# Patient Record
Sex: FEMALE | Race: WHITE | Employment: OTHER | URBAN - METROPOLITAN AREA
[De-identification: names, ages, dates, MRNs, and addresses within clinical notes are randomized per-mention and may not be internally consistent; named-entity substitution may affect disease eponyms.]

---

## 2017-04-04 LAB
ABSOLUTE BASO #: 0.1 K/UL (ref 0–0.1)
ABSOLUTE EOS #: 0.4 K/UL (ref 0.1–0.4)
ABSOLUTE LYMPH #: 2.5 K/UL (ref 0.8–5.2)
ABSOLUTE MONO #: 0.7 K/UL (ref 0.1–0.9)
ABSOLUTE NEUT #: 6 K/UL (ref 1.3–9.1)
ANION GAP SERPL CALCULATED.3IONS-SCNC: 14 MMOL/L
BASOPHILS RELATIVE PERCENT: 0.5 %
BUN BLDV-MCNC: 17 MG/DL (ref 10–20)
CALCIUM SERPL-MCNC: 9.6 MG/DL (ref 8.7–10.8)
CHLORIDE BLD-SCNC: 101 MMOL/L (ref 95–111)
CO2: 28 MMOL/L (ref 21–32)
CREAT SERPL-MCNC: 1.1 MG/DL (ref 0.5–1.3)
EGFR AFRICAN AMERICAN: 60
EGFR IF NONAFRICAN AMERICAN: 49
EOSINOPHILS RELATIVE PERCENT: 3.8 %
GLUCOSE: 96 MG/DL (ref 70–100)
HCT VFR BLD CALC: 40.4 % (ref 36–48)
HEMOGLOBIN: 13.1 G/DL (ref 12–16)
LYMPHOCYTE %: 25.8 %
MCH RBC QN AUTO: 26.9 PG (ref 27–34)
MCHC RBC AUTO-ENTMCNC: 32.4 G/DL (ref 31–36)
MCV RBC AUTO: 83 FL (ref 80–100)
MONOCYTES # BLD: 7.3 %
NEUTROPHILS RELATIVE PERCENT: 61.9 %
PDW BLD-RTO: 13.3 % (ref 10.8–14.8)
PLATELETS: 327 K/UL (ref 150–450)
POTASSIUM SERPL-SCNC: 4.9 MMOL/L (ref 3.5–5.4)
RBC: 4.87 M/UL (ref 4–5.5)
SODIUM BLD-SCNC: 138 MMOL/L (ref 134–147)
WBC: 9.6 K/UL (ref 3.7–10.8)

## 2017-04-05 LAB
AVERAGE GLUCOSE: 123 MG/DL (ref 66–114)
HBA1C MFR BLD: 5.9 % (ref 4.2–5.8)

## 2017-04-17 ENCOUNTER — HOSPITAL ENCOUNTER (OUTPATIENT)
Dept: WOMENS IMAGING | Age: 72
Discharge: HOME | End: 2017-04-17
Payer: MEDICARE

## 2017-04-17 DIAGNOSIS — Z12.31 ENCOUNTER FOR SCREENING MAMMOGRAM FOR HIGH-RISK PATIENT: ICD-10-CM

## 2017-04-17 PROCEDURE — G0202 SCR MAMMO BI INCL CAD: HCPCS

## 2017-10-13 LAB
ABSOLUTE BASO #: 0.1 K/UL (ref 0–0.1)
ABSOLUTE EOS #: 0.3 K/UL (ref 0.1–0.4)
ABSOLUTE LYMPH #: 2.2 K/UL (ref 0.8–5.2)
ABSOLUTE MONO #: 0.7 K/UL (ref 0.1–0.9)
ABSOLUTE NEUT #: 6 K/UL (ref 1.3–9.1)
ALBUMIN SERPL-MCNC: 3.7 G/DL (ref 3.2–5.3)
ALK PHOSPHATASE: 49 IU/L (ref 35–121)
ALT SERPL-CCNC: 13 IU/L (ref 5–59)
ANION GAP SERPL CALCULATED.3IONS-SCNC: 12 MMOL/L
AST SERPL-CCNC: 15 IU/L (ref 10–42)
AVERAGE GLUCOSE: 117 MG/DL (ref 66–114)
BASOPHILS RELATIVE PERCENT: 0.7 %
BILIRUB SERPL-MCNC: 0.5 MG/DL (ref 0.2–1.3)
BUN BLDV-MCNC: 15 MG/DL (ref 10–20)
CALCIUM SERPL-MCNC: 9.3 MG/DL (ref 8.7–10.8)
CHLORIDE BLD-SCNC: 99 MMOL/L (ref 95–111)
CO2: 30 MMOL/L (ref 21–32)
CREAT SERPL-MCNC: 1.1 MG/DL (ref 0.5–1.3)
EGFR AFRICAN AMERICAN: 60
EGFR IF NONAFRICAN AMERICAN: 49
EOSINOPHILS RELATIVE PERCENT: 3.5 %
GLUCOSE: 99 MG/DL (ref 70–100)
HBA1C MFR BLD: 5.7 % (ref 4.2–5.8)
HCT VFR BLD CALC: 40.6 % (ref 36–48)
HEMOGLOBIN: 13.3 G/DL (ref 12–16)
HEPATITIS C ANTIBODY: NEGATIVE
LYMPHOCYTE %: 23.7 %
MCH RBC QN AUTO: 27.4 PG (ref 27–34)
MCHC RBC AUTO-ENTMCNC: 32.8 G/DL (ref 31–36)
MCV RBC AUTO: 83.7 FL (ref 80–100)
MONOCYTES # BLD: 7.1 %
NEUTROPHILS RELATIVE PERCENT: 64.6 %
PDW BLD-RTO: 13.8 % (ref 10.8–14.8)
PLATELETS: 269 K/UL (ref 150–450)
POTASSIUM SERPL-SCNC: 4.3 MMOL/L (ref 3.5–5.4)
RBC: 4.85 M/UL (ref 4–5.5)
SODIUM BLD-SCNC: 137 MMOL/L (ref 134–147)
TOTAL PROTEIN: 6.9 G/DL (ref 5.8–8)
WBC: 9.2 K/UL (ref 3.7–10.8)

## 2018-05-21 ENCOUNTER — HOSPITAL ENCOUNTER (OUTPATIENT)
Dept: WOMENS IMAGING | Age: 73
Discharge: HOME | End: 2018-05-23
Payer: MEDICARE

## 2018-05-21 DIAGNOSIS — Z12.31 ENCOUNTER FOR SCREENING MAMMOGRAM FOR HIGH-RISK PATIENT: ICD-10-CM

## 2018-05-21 DIAGNOSIS — M81.0 SENILE OSTEOPOROSIS: ICD-10-CM

## 2018-05-21 PROCEDURE — 77063 BREAST TOMOSYNTHESIS BI: CPT

## 2018-05-21 PROCEDURE — 77080 DXA BONE DENSITY AXIAL: CPT

## 2018-06-11 ENCOUNTER — HOSPITAL ENCOUNTER (OUTPATIENT)
Age: 73
Setting detail: SPECIMEN
Discharge: HOME | End: 2018-06-11
Payer: MEDICARE

## 2018-06-11 ENCOUNTER — HOSPITAL ENCOUNTER (OUTPATIENT)
Facility: CLINIC | Age: 73
Discharge: HOME | End: 2018-06-13
Payer: MEDICARE

## 2018-06-11 ENCOUNTER — HOSPITAL ENCOUNTER (OUTPATIENT)
Dept: GENERAL RADIOLOGY | Facility: CLINIC | Age: 73
Discharge: HOME | End: 2018-06-13
Payer: MEDICARE

## 2018-06-11 DIAGNOSIS — M25.561 ACUTE PAIN OF RIGHT KNEE: ICD-10-CM

## 2018-06-11 DIAGNOSIS — M79.604 RIGHT LEG PAIN: ICD-10-CM

## 2018-06-11 DIAGNOSIS — M71.21 SYNOVIAL CYST OF RIGHT POPLITEAL SPACE: ICD-10-CM

## 2018-06-11 LAB — D-DIMER QUANTITATIVE: 0.46 MG/L FEU

## 2018-06-11 PROCEDURE — 73560 X-RAY EXAM OF KNEE 1 OR 2: CPT

## 2018-10-17 LAB
ABSOLUTE BASO #: 0.1 K/UL (ref 0–0.1)
ABSOLUTE EOS #: 0.4 K/UL (ref 0.1–0.4)
ABSOLUTE LYMPH #: 2.4 K/UL (ref 0.8–5.2)
ABSOLUTE MONO #: 0.7 K/UL (ref 0.1–0.9)
ABSOLUTE NEUT #: 5.3 K/UL (ref 1.3–9.1)
ANION GAP SERPL CALCULATED.3IONS-SCNC: 16 MEQ/L (ref 10–19)
BASOPHILS RELATIVE PERCENT: 0.9 %
BUN BLDV-MCNC: 16 MG/DL (ref 8–23)
CALCIUM SERPL-MCNC: 9.4 MG/DL (ref 8.5–10.5)
CHLORIDE BLD-SCNC: 100 MEQ/L (ref 95–107)
CO2: 22 MEQ/L (ref 19–31)
CREAT SERPL-MCNC: 1.1 MG/DL (ref 0.6–1.3)
EGFR AFRICAN AMERICAN: 58.9 ML/MIN/1.73 M2
EGFR IF NONAFRICAN AMERICAN: 50.8 ML/MIN/1.73 M2
EOSINOPHILS RELATIVE PERCENT: 4.6 %
GLUCOSE: 98 MG/DL (ref 70–99)
HCT VFR BLD CALC: 38.7 % (ref 36–48)
HEMOGLOBIN: 12.6 G/DL (ref 12–16)
LYMPHOCYTE %: 26.5 %
MCH RBC QN AUTO: 26.5 PG (ref 27–34)
MCHC RBC AUTO-ENTMCNC: 32.6 G/DL (ref 31–36)
MCV RBC AUTO: 81.3 FL (ref 80–100)
MONOCYTES # BLD: 8.2 %
NEUTROPHILS RELATIVE PERCENT: 59.4 %
PDW BLD-RTO: 13.3 % (ref 10.8–14.8)
PLATELETS: 369 K/UL (ref 150–450)
POTASSIUM SERPL-SCNC: 4.5 MEQ/L (ref 3.5–5.4)
RBC: 4.76 M/UL (ref 4–5.5)
SODIUM BLD-SCNC: 138 MEQ/L (ref 135–146)
WBC: 8.9 K/UL (ref 3.7–10.8)

## 2018-10-18 LAB
AVERAGE GLUCOSE: 120 MG/DL (ref 66–114)
HBA1C MFR BLD: 5.8 % (ref 4.2–5.8)

## 2018-10-24 ENCOUNTER — OFFICE VISIT (OUTPATIENT)
Dept: FAMILY MEDICINE CLINIC | Age: 73
End: 2018-10-24
Payer: MEDICARE

## 2018-10-24 VITALS
RESPIRATION RATE: 16 BRPM | WEIGHT: 232 LBS | SYSTOLIC BLOOD PRESSURE: 102 MMHG | HEART RATE: 78 BPM | OXYGEN SATURATION: 98 % | DIASTOLIC BLOOD PRESSURE: 64 MMHG | TEMPERATURE: 98.4 F | BODY MASS INDEX: 36.34 KG/M2

## 2018-10-24 DIAGNOSIS — Z23 NEED FOR PROPHYLACTIC VACCINATION AND INOCULATION AGAINST INFLUENZA: ICD-10-CM

## 2018-10-24 DIAGNOSIS — K21.9 GASTROESOPHAGEAL REFLUX DISEASE WITHOUT ESOPHAGITIS: ICD-10-CM

## 2018-10-24 DIAGNOSIS — Z13.220 SCREENING CHOLESTEROL LEVEL: ICD-10-CM

## 2018-10-24 DIAGNOSIS — I10 ESSENTIAL HYPERTENSION: Primary | ICD-10-CM

## 2018-10-24 DIAGNOSIS — Z85.42 HISTORY OF ENDOMETRIAL CANCER: ICD-10-CM

## 2018-10-24 DIAGNOSIS — N18.30 CHRONIC KIDNEY DISEASE, STAGE III (MODERATE) (HCC): ICD-10-CM

## 2018-10-24 DIAGNOSIS — R73.9 HYPERGLYCEMIA: ICD-10-CM

## 2018-10-24 DIAGNOSIS — M17.11 ARTHRITIS OF RIGHT KNEE: ICD-10-CM

## 2018-10-24 PROCEDURE — 1101F PT FALLS ASSESS-DOCD LE1/YR: CPT | Performed by: FAMILY MEDICINE

## 2018-10-24 PROCEDURE — 90662 IIV NO PRSV INCREASED AG IM: CPT | Performed by: FAMILY MEDICINE

## 2018-10-24 PROCEDURE — 1036F TOBACCO NON-USER: CPT | Performed by: FAMILY MEDICINE

## 2018-10-24 PROCEDURE — G0008 ADMIN INFLUENZA VIRUS VAC: HCPCS | Performed by: FAMILY MEDICINE

## 2018-10-24 PROCEDURE — G8427 DOCREV CUR MEDS BY ELIG CLIN: HCPCS | Performed by: FAMILY MEDICINE

## 2018-10-24 PROCEDURE — 4040F PNEUMOC VAC/ADMIN/RCVD: CPT | Performed by: FAMILY MEDICINE

## 2018-10-24 PROCEDURE — 1123F ACP DISCUSS/DSCN MKR DOCD: CPT | Performed by: FAMILY MEDICINE

## 2018-10-24 PROCEDURE — G8399 PT W/DXA RESULTS DOCUMENT: HCPCS | Performed by: FAMILY MEDICINE

## 2018-10-24 PROCEDURE — 1090F PRES/ABSN URINE INCON ASSESS: CPT | Performed by: FAMILY MEDICINE

## 2018-10-24 PROCEDURE — G8482 FLU IMMUNIZE ORDER/ADMIN: HCPCS | Performed by: FAMILY MEDICINE

## 2018-10-24 PROCEDURE — 99214 OFFICE O/P EST MOD 30 MIN: CPT | Performed by: FAMILY MEDICINE

## 2018-10-24 PROCEDURE — 3017F COLORECTAL CA SCREEN DOC REV: CPT | Performed by: FAMILY MEDICINE

## 2018-10-24 PROCEDURE — G8417 CALC BMI ABV UP PARAM F/U: HCPCS | Performed by: FAMILY MEDICINE

## 2018-10-24 ASSESSMENT — ENCOUNTER SYMPTOMS
SORE THROAT: 0
BACK PAIN: 0
DIARRHEA: 0
SHORTNESS OF BREATH: 0
RHINORRHEA: 0
CONSTIPATION: 0
COUGH: 0
ABDOMINAL DISTENTION: 0
VOMITING: 0
NAUSEA: 0
CHEST TIGHTNESS: 0
ABDOMINAL PAIN: 0

## 2018-12-28 ENCOUNTER — TELEPHONE (OUTPATIENT)
Dept: FAMILY MEDICINE CLINIC | Age: 73
End: 2018-12-28

## 2018-12-28 NOTE — TELEPHONE ENCOUNTER
Patient got summoned for jury duty for the third time in 5 years. The last time it lasted a while and she didn't take her BP med because it has a diuretic in it. She would take it when she got home, but would be up all night using the BR. Would like a note to excuse her. Letter printed and patient will pick it up today.

## 2018-12-28 NOTE — LETTER
[facility]  [address] , [place] [address]  Phone: [phone]  Fax: [phone]    Alin John MD        December 28, 2018     Patient: Savanna Sheehan   YOB: 1945   Date of Visit: 12/28/2018       To Whom It May Concern: It is my medical opinion that Collins Randall is unable to perform jury duty at this time. If you have any questions or concerns, please don't hesitate to call.     Sincerely,        Alin John MD

## 2019-04-18 LAB
ALBUMIN SERPL-MCNC: NORMAL G/DL
ALP BLD-CCNC: NORMAL U/L
ALT SERPL-CCNC: NORMAL U/L
ANION GAP SERPL CALCULATED.3IONS-SCNC: 11 MMOL/L
AST SERPL-CCNC: NORMAL U/L
AVERAGE GLUCOSE: 117
BASOPHILS ABSOLUTE: NORMAL /ΜL
BASOPHILS RELATIVE PERCENT: NORMAL %
BILIRUB SERPL-MCNC: NORMAL MG/DL (ref 0.1–1.4)
BUN BLDV-MCNC: 17 MG/DL
CALCIUM SERPL-MCNC: 9.3 MG/DL
CHLORIDE BLD-SCNC: 101 MMOL/L
CHOLESTEROL, TOTAL: 142 MG/DL
CHOLESTEROL/HDL RATIO: 3.2
CO2: 24 MMOL/L
CREAT SERPL-MCNC: 1.18 MG/DL
EOSINOPHILS ABSOLUTE: NORMAL /ΜL
EOSINOPHILS RELATIVE PERCENT: NORMAL %
GFR CALCULATED: NORMAL
GLUCOSE BLD-MCNC: 95 MG/DL
HBA1C MFR BLD: 5.7 %
HCT VFR BLD CALC: 39.5 % (ref 36–46)
HDLC SERPL-MCNC: 45 MG/DL (ref 35–70)
HEMOGLOBIN: 13.6 G/DL (ref 12–16)
LDL CHOLESTEROL CALCULATED: 73 MG/DL (ref 0–160)
LYMPHOCYTES ABSOLUTE: NORMAL /ΜL
LYMPHOCYTES RELATIVE PERCENT: NORMAL %
MCH RBC QN AUTO: NORMAL PG
MCHC RBC AUTO-ENTMCNC: NORMAL G/DL
MCV RBC AUTO: NORMAL FL
MONOCYTES ABSOLUTE: NORMAL /ΜL
MONOCYTES RELATIVE PERCENT: NORMAL %
NEUTROPHILS ABSOLUTE: NORMAL /ΜL
NEUTROPHILS RELATIVE PERCENT: NORMAL %
PLATELET # BLD: NORMAL K/ΜL
PMV BLD AUTO: NORMAL FL
POTASSIUM SERPL-SCNC: 4.6 MMOL/L
RBC # BLD: NORMAL 10^6/ΜL
SODIUM BLD-SCNC: 136 MMOL/L
TOTAL PROTEIN: NORMAL
TRIGL SERPL-MCNC: 122 MG/DL
VLDLC SERPL CALC-MCNC: 24 MG/DL
WBC # BLD: 9.6 10^3/ML

## 2019-04-19 DIAGNOSIS — Z13.220 SCREENING CHOLESTEROL LEVEL: ICD-10-CM

## 2019-04-19 DIAGNOSIS — R73.9 HYPERGLYCEMIA: ICD-10-CM

## 2019-04-19 DIAGNOSIS — I10 ESSENTIAL HYPERTENSION: ICD-10-CM

## 2019-04-24 ENCOUNTER — OFFICE VISIT (OUTPATIENT)
Dept: FAMILY MEDICINE CLINIC | Age: 74
End: 2019-04-24
Payer: MEDICARE

## 2019-04-24 VITALS
HEART RATE: 56 BPM | WEIGHT: 237 LBS | OXYGEN SATURATION: 96 % | BODY MASS INDEX: 37.12 KG/M2 | TEMPERATURE: 96.9 F | DIASTOLIC BLOOD PRESSURE: 66 MMHG | RESPIRATION RATE: 16 BRPM | SYSTOLIC BLOOD PRESSURE: 116 MMHG

## 2019-04-24 DIAGNOSIS — Z12.31 ENCOUNTER FOR SCREENING MAMMOGRAM FOR HIGH-RISK PATIENT: ICD-10-CM

## 2019-04-24 DIAGNOSIS — K21.9 GASTROESOPHAGEAL REFLUX DISEASE WITHOUT ESOPHAGITIS: ICD-10-CM

## 2019-04-24 DIAGNOSIS — I10 ESSENTIAL HYPERTENSION: Primary | ICD-10-CM

## 2019-04-24 DIAGNOSIS — N18.30 CHRONIC KIDNEY DISEASE, STAGE III (MODERATE) (HCC): ICD-10-CM

## 2019-04-24 DIAGNOSIS — R73.9 HYPERGLYCEMIA: ICD-10-CM

## 2019-04-24 PROCEDURE — 3288F FALL RISK ASSESSMENT DOCD: CPT | Performed by: FAMILY MEDICINE

## 2019-04-24 PROCEDURE — 1123F ACP DISCUSS/DSCN MKR DOCD: CPT | Performed by: FAMILY MEDICINE

## 2019-04-24 PROCEDURE — 3017F COLORECTAL CA SCREEN DOC REV: CPT | Performed by: FAMILY MEDICINE

## 2019-04-24 PROCEDURE — 1090F PRES/ABSN URINE INCON ASSESS: CPT | Performed by: FAMILY MEDICINE

## 2019-04-24 PROCEDURE — 4040F PNEUMOC VAC/ADMIN/RCVD: CPT | Performed by: FAMILY MEDICINE

## 2019-04-24 PROCEDURE — G8427 DOCREV CUR MEDS BY ELIG CLIN: HCPCS | Performed by: FAMILY MEDICINE

## 2019-04-24 PROCEDURE — 99214 OFFICE O/P EST MOD 30 MIN: CPT | Performed by: FAMILY MEDICINE

## 2019-04-24 PROCEDURE — G8417 CALC BMI ABV UP PARAM F/U: HCPCS | Performed by: FAMILY MEDICINE

## 2019-04-24 PROCEDURE — G8510 SCR DEP NEG, NO PLAN REQD: HCPCS | Performed by: FAMILY MEDICINE

## 2019-04-24 PROCEDURE — 1036F TOBACCO NON-USER: CPT | Performed by: FAMILY MEDICINE

## 2019-04-24 PROCEDURE — G8399 PT W/DXA RESULTS DOCUMENT: HCPCS | Performed by: FAMILY MEDICINE

## 2019-04-24 RX ORDER — LISINOPRIL AND HYDROCHLOROTHIAZIDE 20; 12.5 MG/1; MG/1
1 TABLET ORAL DAILY
Qty: 90 TABLET | Refills: 3 | Status: SHIPPED | OUTPATIENT
Start: 2019-04-24 | End: 2020-04-02

## 2019-04-24 ASSESSMENT — ENCOUNTER SYMPTOMS
ABDOMINAL DISTENTION: 0
BACK PAIN: 0
CHEST TIGHTNESS: 0
NAUSEA: 0
SHORTNESS OF BREATH: 0
COUGH: 0
RHINORRHEA: 0
CONSTIPATION: 0
SORE THROAT: 0
ABDOMINAL PAIN: 0
DIARRHEA: 0
VOMITING: 0

## 2019-04-24 ASSESSMENT — PATIENT HEALTH QUESTIONNAIRE - PHQ9
1. LITTLE INTEREST OR PLEASURE IN DOING THINGS: 1
SUM OF ALL RESPONSES TO PHQ QUESTIONS 1-9: 2
SUM OF ALL RESPONSES TO PHQ9 QUESTIONS 1 & 2: 2
SUM OF ALL RESPONSES TO PHQ QUESTIONS 1-9: 2
2. FEELING DOWN, DEPRESSED OR HOPELESS: 1

## 2019-04-24 NOTE — PROGRESS NOTES
Subjective:      Patient ID: Sandra Tyson is a 72 y.o. female. HPI  Pt here today for f/u on chronic medical problems:  HTN, Hyperglycemia, CKD, GERD,go over labs and/or diagnostic studies, and medication refills. Pt today denies any HA, chest pain, or SOB. Pt denies any N/V/D/C or abdominal pain. Pt denies any fever or chills. Pt with occasional heartburn, but stable on meds. Pt with arthralgias on and off, but stable on OTC meds. Otherwise pt doing well on current tx and no other concerns today. The patient's past medical, surgical, social, and family history as well as his current medications and allergies were reviewed as documented in today's encounter. Current Outpatient Medications   Medication Sig Dispense Refill    lisinopril-hydrochlorothiazide (PRINZIDE;ZESTORETIC) 20-12.5 MG per tablet Take 1 tablet by mouth daily 90 tablet 3    acyclovir (ZOVIRAX) 5 % ointment Apply topically every 3 hours. 45 g 1    omeprazole (PRILOSEC OTC) 20 MG tablet Take 20 mg by mouth every other day.  Calcium Carbonate (CALTRATE 600 PO) Take 1 tablet by mouth daily.  Cholecalciferol (VITAMIN D) 2000 UNIT CAPS capsule Take 1 capsule by mouth daily.  Ibuprofen (MOTRIN IB PO) Take 200 mg by mouth as needed. No current facility-administered medications for this visit. Review of Systems   Constitutional: Negative for appetite change, fatigue and fever. HENT: Negative for congestion, ear pain, rhinorrhea and sore throat. Respiratory: Negative for cough, chest tightness and shortness of breath. Cardiovascular: Negative for chest pain and palpitations. Gastrointestinal: Negative for abdominal distention, abdominal pain, constipation, diarrhea, nausea and vomiting. Occ heartburn   Genitourinary: Negative for difficulty urinating and dysuria. Musculoskeletal: Positive for arthralgias. Negative for back pain and myalgias. Skin: Negative for rash.    Neurological: Negative for dizziness, weakness, light-headedness and headaches. Hematological: Negative for adenopathy. Psychiatric/Behavioral: Negative for behavioral problems and sleep disturbance. The patient is not nervous/anxious. Objective:   Physical Exam   Constitutional: She is oriented to person, place, and time. She appears well-developed. No distress. HENT:   Head: Normocephalic and atraumatic. Right Ear: External ear normal.   Left Ear: External ear normal.   Nose: Nose normal.   Mouth/Throat: Oropharynx is clear and moist. No oropharyngeal exudate. Eyes: Pupils are equal, round, and reactive to light. Conjunctivae and EOM are normal.   Neck: Normal range of motion. Cardiovascular: Normal rate, regular rhythm, normal heart sounds and intact distal pulses. No murmur heard. Pulmonary/Chest: Effort normal and breath sounds normal. No respiratory distress. She has no wheezes. She exhibits no tenderness. Abdominal: Soft. Bowel sounds are normal. She exhibits no distension and no mass. There is no tenderness. Musculoskeletal: Normal range of motion. She exhibits no edema or tenderness. Lymphadenopathy:     She has no cervical adenopathy. Neurological: She is alert and oriented to person, place, and time. She has normal reflexes. Skin: No rash noted. Psychiatric: She has a normal mood and affect. Her behavior is normal.   Vitals reviewed. Labs reviewed with pt today. Assessment:       Diagnosis Orders   1. Essential hypertension  CBC    lisinopril-hydrochlorothiazide (PRINZIDE;ZESTORETIC) 20-12.5 MG per tablet    Comprehensive Metabolic Panel   2. Hyperglycemia  Hemoglobin A1C    Comprehensive Metabolic Panel   3. Chronic kidney disease, stage III (moderate) (HCC)  Comprehensive Metabolic Panel    stable   4. Gastroesophageal reflux disease without esophagitis     5.  Encounter for screening mammogram for high-risk patient  JASWINDER DIGITAL SCREEN W CAD BILATERAL         Plan:      Orders Placed This Encounter   Procedures    JASWINDER DIGITAL SCREEN W CAD BILATERAL     [facility] CARE     Standing Status:   Future     Standing Expiration Date:   6/23/2020     Order Specific Question:   Reason for exam:     Answer:   Screening    CBC     Standing Status:   Future     Standing Expiration Date:   4/23/2020    Hemoglobin A1C     Standing Status:   Future     Standing Expiration Date:   4/23/2020    Comprehensive Metabolic Panel     Standing Status:   Future     Standing Expiration Date:   4/23/2020      Orders Placed This Encounter   Medications    lisinopril-hydrochlorothiazide (PRINZIDE;ZESTORETIC) 20-12.5 MG per tablet     Sig: Take 1 tablet by mouth daily     Dispense:  90 tablet     Refill:  3     Will cont with current treatment as pt is currently stable on current treatment    Continue to watch carbs secondary to increased Triglycerides and BS    Increase fluids throughout the day    Rest of systems unchanged, continue current treatments. Medications, labs, diagnostic studies, consultations and follow-up as documented in this encounter.  Rest of systems unchanged, continue current treatments        Flo Arellano MD

## 2019-04-24 NOTE — PROGRESS NOTES
HYPERTENSION visit     BP Readings from Last 3 Encounters:   10/24/18 102/64   06/11/18 134/82   04/20/18 122/74       LDL Calculated (mg/dL)   Date Value   04/18/2019 73     LDL Cholesterol (mg/dL)   Date Value   09/25/2015 86     HDL (mg/dL)   Date Value   04/18/2019 45     BUN (mg/dL)   Date Value   04/18/2019 17     CREATININE (no units)   Date Value   04/18/2019 1.18     Glucose (mg/dL)   Date Value   04/18/2019 95   10/17/2018 98              Have you changed or started any medications since your last visit including any over-the-counter medicines, vitamins, or herbal medicines? no   Have you stopped taking any of your medications? Is so, why? -  no  Are you having any side effects from any of your medications? - no  How often do you miss doses of your medication? rare      Have you seen any other physician or provider since your last visit?  no   Have you had any other diagnostic tests since your last visit? yes - labs   Have you been seen in the emergency room and/or had an admission in a hospital since we last saw you?  no   Have you had your routine dental cleaning in the past 6 months?  yes - 04/2019     Do you have an active MyChart account? If no, what is the barrier?   Yes    Patient Care Team:  Suellen Paulson MD as PCP - General (Family Medicine)  Suellen Paulson MD as PCP - S Attributed Provider  Ann Leon as Consulting Physician (Optometry)    Medical History Review  Past Medical, Family, and Social History reviewed and does contribute to the patient presenting condition    Health Maintenance   Topic Date Due    Breast cancer screen  05/21/2019    Shingles Vaccine (2 of 3) 10/24/2019 (Originally 5/28/2014)    DTaP/Tdap/Td vaccine (1 - Tdap) 10/06/2021 (Originally 12/25/1966)    A1C test (Diabetic or Prediabetic)  04/18/2020    Potassium monitoring  04/18/2020    Creatinine monitoring  04/18/2020    Colon cancer screen fecal DNA test (Cologuard)  11/13/2020    DEXA (modify frequency per FRAX score)  05/21/2021    Lipid screen  04/18/2024    Flu vaccine  Completed    Pneumococcal 65+ years Vaccine  Completed    Hepatitis C screen  Completed     Patient/Caregiver verbalize understanding of medications.   Yes

## 2019-07-02 ENCOUNTER — HOSPITAL ENCOUNTER (OUTPATIENT)
Dept: WOMENS IMAGING | Age: 74
Discharge: HOME | End: 2019-07-04
Payer: MEDICARE

## 2019-07-02 DIAGNOSIS — Z12.31 ENCOUNTER FOR SCREENING MAMMOGRAM FOR HIGH-RISK PATIENT: ICD-10-CM

## 2019-07-02 PROCEDURE — 77063 BREAST TOMOSYNTHESIS BI: CPT

## 2019-10-14 LAB
ALBUMIN SERPL-MCNC: 3.9 G/DL
ALP BLD-CCNC: 43 U/L
ALT SERPL-CCNC: 16 U/L
ANION GAP SERPL CALCULATED.3IONS-SCNC: 10 MMOL/L
AST SERPL-CCNC: 16 U/L
AVERAGE GLUCOSE: 126
BASOPHILS ABSOLUTE: NORMAL /ΜL
BASOPHILS RELATIVE PERCENT: NORMAL %
BILIRUB SERPL-MCNC: 0.5 MG/DL (ref 0.1–1.4)
BUN BLDV-MCNC: 18 MG/DL
CALCIUM SERPL-MCNC: 9.3 MG/DL
CHLORIDE BLD-SCNC: 99 MMOL/L
CO2: 30 MMOL/L
CREAT SERPL-MCNC: 1.17 MG/DL
EOSINOPHILS ABSOLUTE: NORMAL /ΜL
EOSINOPHILS RELATIVE PERCENT: NORMAL %
GFR CALCULATED: 46
GLUCOSE BLD-MCNC: 104 MG/DL
HBA1C MFR BLD: 6 %
HCT VFR BLD CALC: 41 % (ref 36–46)
HEMOGLOBIN: 13.5 G/DL (ref 12–16)
LYMPHOCYTES ABSOLUTE: NORMAL /ΜL
LYMPHOCYTES RELATIVE PERCENT: NORMAL %
MCH RBC QN AUTO: NORMAL PG
MCHC RBC AUTO-ENTMCNC: NORMAL G/DL
MCV RBC AUTO: NORMAL FL
MONOCYTES ABSOLUTE: NORMAL /ΜL
MONOCYTES RELATIVE PERCENT: NORMAL %
NEUTROPHILS ABSOLUTE: NORMAL /ΜL
NEUTROPHILS RELATIVE PERCENT: NORMAL %
PLATELET # BLD: 263 K/ΜL
PMV BLD AUTO: NORMAL FL
POTASSIUM SERPL-SCNC: 4.8 MMOL/L
RBC # BLD: NORMAL 10^6/ΜL
SODIUM BLD-SCNC: 139 MMOL/L
TOTAL PROTEIN: 6.7
WBC # BLD: 9.3 10^3/ML

## 2019-10-16 DIAGNOSIS — N18.30 CHRONIC KIDNEY DISEASE, STAGE III (MODERATE) (HCC): ICD-10-CM

## 2019-10-16 DIAGNOSIS — I10 ESSENTIAL HYPERTENSION: ICD-10-CM

## 2019-10-16 DIAGNOSIS — R73.9 HYPERGLYCEMIA: ICD-10-CM

## 2019-10-21 ENCOUNTER — OFFICE VISIT (OUTPATIENT)
Dept: FAMILY MEDICINE CLINIC | Age: 74
End: 2019-10-21
Payer: MEDICARE

## 2019-10-21 VITALS
WEIGHT: 242 LBS | BODY MASS INDEX: 38.89 KG/M2 | HEIGHT: 66 IN | TEMPERATURE: 96.3 F | OXYGEN SATURATION: 93 % | DIASTOLIC BLOOD PRESSURE: 82 MMHG | HEART RATE: 90 BPM | SYSTOLIC BLOOD PRESSURE: 110 MMHG

## 2019-10-21 DIAGNOSIS — K21.9 GASTROESOPHAGEAL REFLUX DISEASE WITHOUT ESOPHAGITIS: ICD-10-CM

## 2019-10-21 DIAGNOSIS — N18.30 CHRONIC KIDNEY DISEASE, STAGE III (MODERATE) (HCC): ICD-10-CM

## 2019-10-21 DIAGNOSIS — I10 ESSENTIAL HYPERTENSION: Primary | ICD-10-CM

## 2019-10-21 DIAGNOSIS — M19.90 OSTEOARTHRITIS, UNSPECIFIED OSTEOARTHRITIS TYPE, UNSPECIFIED SITE: ICD-10-CM

## 2019-10-21 DIAGNOSIS — R73.9 HYPERGLYCEMIA: ICD-10-CM

## 2019-10-21 DIAGNOSIS — Z13.220 SCREENING CHOLESTEROL LEVEL: ICD-10-CM

## 2019-10-21 DIAGNOSIS — Z23 FLU VACCINE NEED: ICD-10-CM

## 2019-10-21 PROCEDURE — G0008 ADMIN INFLUENZA VIRUS VAC: HCPCS | Performed by: FAMILY MEDICINE

## 2019-10-21 PROCEDURE — 90653 IIV ADJUVANT VACCINE IM: CPT | Performed by: FAMILY MEDICINE

## 2019-10-21 PROCEDURE — G8427 DOCREV CUR MEDS BY ELIG CLIN: HCPCS | Performed by: FAMILY MEDICINE

## 2019-10-21 PROCEDURE — G8417 CALC BMI ABV UP PARAM F/U: HCPCS | Performed by: FAMILY MEDICINE

## 2019-10-21 PROCEDURE — G8482 FLU IMMUNIZE ORDER/ADMIN: HCPCS | Performed by: FAMILY MEDICINE

## 2019-10-21 PROCEDURE — 99214 OFFICE O/P EST MOD 30 MIN: CPT | Performed by: FAMILY MEDICINE

## 2019-10-21 PROCEDURE — 4040F PNEUMOC VAC/ADMIN/RCVD: CPT | Performed by: FAMILY MEDICINE

## 2019-10-21 PROCEDURE — 1090F PRES/ABSN URINE INCON ASSESS: CPT | Performed by: FAMILY MEDICINE

## 2019-10-21 PROCEDURE — G8399 PT W/DXA RESULTS DOCUMENT: HCPCS | Performed by: FAMILY MEDICINE

## 2019-10-21 PROCEDURE — 1036F TOBACCO NON-USER: CPT | Performed by: FAMILY MEDICINE

## 2019-10-21 PROCEDURE — 1123F ACP DISCUSS/DSCN MKR DOCD: CPT | Performed by: FAMILY MEDICINE

## 2019-10-21 PROCEDURE — 3017F COLORECTAL CA SCREEN DOC REV: CPT | Performed by: FAMILY MEDICINE

## 2019-10-21 RX ORDER — COVID-19 ANTIGEN TEST
KIT MISCELLANEOUS
COMMUNITY

## 2019-10-21 ASSESSMENT — ENCOUNTER SYMPTOMS
SORE THROAT: 0
RHINORRHEA: 0
VOMITING: 0
COUGH: 0
ABDOMINAL DISTENTION: 0
BACK PAIN: 0
ABDOMINAL PAIN: 0
SHORTNESS OF BREATH: 0
CONSTIPATION: 0
CHEST TIGHTNESS: 0
NAUSEA: 0
DIARRHEA: 0

## 2019-12-11 ENCOUNTER — OFFICE VISIT (OUTPATIENT)
Dept: FAMILY MEDICINE CLINIC | Age: 74
End: 2019-12-11
Payer: MEDICARE

## 2019-12-11 VITALS
TEMPERATURE: 97.3 F | DIASTOLIC BLOOD PRESSURE: 82 MMHG | BODY MASS INDEX: 37.83 KG/M2 | WEIGHT: 241 LBS | SYSTOLIC BLOOD PRESSURE: 118 MMHG | HEIGHT: 67 IN | OXYGEN SATURATION: 93 % | HEART RATE: 70 BPM

## 2019-12-11 DIAGNOSIS — Z00.00 ROUTINE GENERAL MEDICAL EXAMINATION AT A HEALTH CARE FACILITY: Primary | ICD-10-CM

## 2019-12-11 PROCEDURE — 4040F PNEUMOC VAC/ADMIN/RCVD: CPT | Performed by: FAMILY MEDICINE

## 2019-12-11 PROCEDURE — 1123F ACP DISCUSS/DSCN MKR DOCD: CPT | Performed by: FAMILY MEDICINE

## 2019-12-11 PROCEDURE — G8482 FLU IMMUNIZE ORDER/ADMIN: HCPCS | Performed by: FAMILY MEDICINE

## 2019-12-11 PROCEDURE — 3017F COLORECTAL CA SCREEN DOC REV: CPT | Performed by: FAMILY MEDICINE

## 2019-12-11 PROCEDURE — G0438 PPPS, INITIAL VISIT: HCPCS | Performed by: FAMILY MEDICINE

## 2019-12-11 ASSESSMENT — PATIENT HEALTH QUESTIONNAIRE - PHQ9
SUM OF ALL RESPONSES TO PHQ QUESTIONS 1-9: 0
SUM OF ALL RESPONSES TO PHQ QUESTIONS 1-9: 0

## 2019-12-11 ASSESSMENT — LIFESTYLE VARIABLES: HOW OFTEN DO YOU HAVE A DRINK CONTAINING ALCOHOL: 0

## 2019-12-23 ENCOUNTER — TELEPHONE (OUTPATIENT)
Dept: FAMILY MEDICINE CLINIC | Age: 74
End: 2019-12-23

## 2019-12-23 RX ORDER — CIPROFLOXACIN 500 MG/1
500 TABLET, FILM COATED ORAL 2 TIMES DAILY
Qty: 14 TABLET | Refills: 0 | Status: SHIPPED | OUTPATIENT
Start: 2019-12-23 | End: 2019-12-30

## 2020-04-02 RX ORDER — LISINOPRIL AND HYDROCHLOROTHIAZIDE 20; 12.5 MG/1; MG/1
TABLET ORAL
Qty: 90 TABLET | Refills: 3 | Status: SHIPPED | OUTPATIENT
Start: 2020-04-02 | End: 2021-04-22 | Stop reason: SINTOL

## 2020-04-21 ENCOUNTER — TELEMEDICINE (OUTPATIENT)
Dept: FAMILY MEDICINE CLINIC | Age: 75
End: 2020-04-21
Payer: MEDICARE

## 2020-04-21 PROCEDURE — G8427 DOCREV CUR MEDS BY ELIG CLIN: HCPCS | Performed by: FAMILY MEDICINE

## 2020-04-21 PROCEDURE — 99214 OFFICE O/P EST MOD 30 MIN: CPT | Performed by: FAMILY MEDICINE

## 2020-04-21 PROCEDURE — 4040F PNEUMOC VAC/ADMIN/RCVD: CPT | Performed by: FAMILY MEDICINE

## 2020-04-21 PROCEDURE — 1123F ACP DISCUSS/DSCN MKR DOCD: CPT | Performed by: FAMILY MEDICINE

## 2020-04-21 PROCEDURE — 3017F COLORECTAL CA SCREEN DOC REV: CPT | Performed by: FAMILY MEDICINE

## 2020-04-21 PROCEDURE — 1090F PRES/ABSN URINE INCON ASSESS: CPT | Performed by: FAMILY MEDICINE

## 2020-04-21 PROCEDURE — G8510 SCR DEP NEG, NO PLAN REQD: HCPCS | Performed by: FAMILY MEDICINE

## 2020-04-21 PROCEDURE — G8399 PT W/DXA RESULTS DOCUMENT: HCPCS | Performed by: FAMILY MEDICINE

## 2020-04-21 ASSESSMENT — ENCOUNTER SYMPTOMS
RHINORRHEA: 0
SHORTNESS OF BREATH: 0
DIARRHEA: 0
ABDOMINAL DISTENTION: 0
COUGH: 0
CONSTIPATION: 0
NAUSEA: 0
CHEST TIGHTNESS: 0
BACK PAIN: 0
ABDOMINAL PAIN: 0
VOMITING: 0
SORE THROAT: 0

## 2020-04-21 ASSESSMENT — PATIENT HEALTH QUESTIONNAIRE - PHQ9
SUM OF ALL RESPONSES TO PHQ QUESTIONS 1-9: 0
1. LITTLE INTEREST OR PLEASURE IN DOING THINGS: 0
SUM OF ALL RESPONSES TO PHQ9 QUESTIONS 1 & 2: 0
SUM OF ALL RESPONSES TO PHQ QUESTIONS 1-9: 0
2. FEELING DOWN, DEPRESSED OR HOPELESS: 0

## 2020-09-12 ENCOUNTER — HOSPITAL ENCOUNTER (OUTPATIENT)
Dept: WOMENS IMAGING | Age: 75
Discharge: HOME | End: 2020-09-14
Payer: MEDICARE

## 2020-09-12 PROCEDURE — 77063 BREAST TOMOSYNTHESIS BI: CPT

## 2020-10-08 ENCOUNTER — HOSPITAL ENCOUNTER (OUTPATIENT)
Age: 75
Setting detail: SPECIMEN
Discharge: HOME | End: 2020-10-08
Payer: MEDICARE

## 2020-10-08 LAB
ALBUMIN SERPL-MCNC: 3.9 G/DL (ref 3.5–5.2)
ALBUMIN/GLOBULIN RATIO: 1.2 (ref 1–2.5)
ALP BLD-CCNC: 48 U/L (ref 35–104)
ALT SERPL-CCNC: 15 U/L (ref 5–33)
ANION GAP SERPL CALCULATED.3IONS-SCNC: 15 MMOL/L (ref 9–17)
AST SERPL-CCNC: 18 U/L
BILIRUB SERPL-MCNC: 0.5 MG/DL (ref 0.3–1.2)
BUN BLDV-MCNC: 18 MG/DL (ref 8–23)
BUN/CREAT BLD: ABNORMAL (ref 9–20)
CALCIUM SERPL-MCNC: 9.6 MG/DL (ref 8.6–10.4)
CHLORIDE BLD-SCNC: 98 MMOL/L (ref 98–107)
CHOLESTEROL/HDL RATIO: 3.1
CHOLESTEROL: 140 MG/DL
CO2: 24 MMOL/L (ref 20–31)
CREAT SERPL-MCNC: 1.14 MG/DL (ref 0.5–0.9)
ESTIMATED AVERAGE GLUCOSE: 126 MG/DL
GFR AFRICAN AMERICAN: 57 ML/MIN
GFR NON-AFRICAN AMERICAN: 47 ML/MIN
GFR SERPL CREATININE-BSD FRML MDRD: ABNORMAL ML/MIN/{1.73_M2}
GFR SERPL CREATININE-BSD FRML MDRD: ABNORMAL ML/MIN/{1.73_M2}
GLUCOSE BLD-MCNC: 97 MG/DL (ref 70–99)
HBA1C MFR BLD: 6 % (ref 4–6)
HCT VFR BLD CALC: 45.1 % (ref 36.3–47.1)
HDLC SERPL-MCNC: 45 MG/DL
HEMOGLOBIN: 13.5 G/DL (ref 11.9–15.1)
LDL CHOLESTEROL: 70 MG/DL (ref 0–130)
MCH RBC QN AUTO: 27.2 PG (ref 25.2–33.5)
MCHC RBC AUTO-ENTMCNC: 29.9 G/DL (ref 28.4–34.8)
MCV RBC AUTO: 90.7 FL (ref 82.6–102.9)
NRBC AUTOMATED: 0 PER 100 WBC
PDW BLD-RTO: 14.1 % (ref 11.8–14.4)
PLATELET # BLD: NORMAL K/UL (ref 138–453)
PLATELET, FLUORESCENCE: 313 K/UL (ref 138–453)
PLATELET, IMMATURE FRACTION: 10.5 % (ref 1.1–10.3)
PMV BLD AUTO: NORMAL FL (ref 8.1–13.5)
POTASSIUM SERPL-SCNC: 4.9 MMOL/L (ref 3.7–5.3)
RBC # BLD: 4.97 M/UL (ref 3.95–5.11)
SODIUM BLD-SCNC: 137 MMOL/L (ref 135–144)
TOTAL PROTEIN: 7.2 G/DL (ref 6.4–8.3)
TRIGL SERPL-MCNC: 124 MG/DL
VLDLC SERPL CALC-MCNC: NORMAL MG/DL (ref 1–30)
WBC # BLD: 10.5 K/UL (ref 3.5–11.3)

## 2020-10-22 ENCOUNTER — OFFICE VISIT (OUTPATIENT)
Dept: FAMILY MEDICINE CLINIC | Age: 75
End: 2020-10-22
Payer: MEDICARE

## 2020-10-22 VITALS
SYSTOLIC BLOOD PRESSURE: 122 MMHG | TEMPERATURE: 97.8 F | HEART RATE: 84 BPM | OXYGEN SATURATION: 98 % | BODY MASS INDEX: 38.25 KG/M2 | WEIGHT: 240.6 LBS | DIASTOLIC BLOOD PRESSURE: 74 MMHG

## 2020-10-22 PROCEDURE — 99214 OFFICE O/P EST MOD 30 MIN: CPT | Performed by: FAMILY MEDICINE

## 2020-10-22 PROCEDURE — 1090F PRES/ABSN URINE INCON ASSESS: CPT | Performed by: FAMILY MEDICINE

## 2020-10-22 PROCEDURE — 1123F ACP DISCUSS/DSCN MKR DOCD: CPT | Performed by: FAMILY MEDICINE

## 2020-10-22 PROCEDURE — 4040F PNEUMOC VAC/ADMIN/RCVD: CPT | Performed by: FAMILY MEDICINE

## 2020-10-22 PROCEDURE — G8417 CALC BMI ABV UP PARAM F/U: HCPCS | Performed by: FAMILY MEDICINE

## 2020-10-22 PROCEDURE — G8399 PT W/DXA RESULTS DOCUMENT: HCPCS | Performed by: FAMILY MEDICINE

## 2020-10-22 PROCEDURE — 3017F COLORECTAL CA SCREEN DOC REV: CPT | Performed by: FAMILY MEDICINE

## 2020-10-22 PROCEDURE — G8484 FLU IMMUNIZE NO ADMIN: HCPCS | Performed by: FAMILY MEDICINE

## 2020-10-22 PROCEDURE — 90694 VACC AIIV4 NO PRSRV 0.5ML IM: CPT | Performed by: FAMILY MEDICINE

## 2020-10-22 PROCEDURE — 1036F TOBACCO NON-USER: CPT | Performed by: FAMILY MEDICINE

## 2020-10-22 PROCEDURE — G0008 ADMIN INFLUENZA VIRUS VAC: HCPCS | Performed by: FAMILY MEDICINE

## 2020-10-22 PROCEDURE — G8427 DOCREV CUR MEDS BY ELIG CLIN: HCPCS | Performed by: FAMILY MEDICINE

## 2020-10-22 ASSESSMENT — ENCOUNTER SYMPTOMS
VOMITING: 0
NAUSEA: 0
DIARRHEA: 0
SORE THROAT: 0
CONSTIPATION: 0
RHINORRHEA: 0
ABDOMINAL DISTENTION: 0
BACK PAIN: 0
CHEST TIGHTNESS: 0
SHORTNESS OF BREATH: 0
COUGH: 0
ABDOMINAL PAIN: 0

## 2020-10-22 NOTE — PATIENT INSTRUCTIONS
Patient Education        Knee Arthritis: Exercises  Introduction  Here are some examples of exercises for you to try. The exercises may be suggested for a condition or for rehabilitation. Start each exercise slowly. Ease off the exercises if you start to have pain. You will be told when to start these exercises and which ones will work best for you. How to do the exercises  Knee flexion with heel slide   1. Lie on your back with your knees bent. 2. Slide your heel back by bending your affected knee as far as you can. Then hook your other foot around your ankle to help pull your heel even farther back. 3. Hold for about 6 seconds, then rest for up to 10 seconds. 4. Repeat 8 to 12 times. 5. Switch legs and repeat steps 1 through 4, even if only one knee is sore. Quad sets   1. Sit with your affected leg straight and supported on the floor or a firm bed. Place a small, rolled-up towel under your knee. Your other leg should be bent, with that foot flat on the floor. 2. Tighten the thigh muscles of your affected leg by pressing the back of your knee down into the towel. 3. Hold for about 6 seconds, then rest for up to 10 seconds. 4. Repeat 8 to 12 times. 5. Switch legs and repeat steps 1 through 4, even if only one knee is sore. Straight-leg raises to the front   1. Lie on your back with your good knee bent so that your foot rests flat on the floor. Your affected leg should be straight. Make sure that your low back has a normal curve. You should be able to slip your hand in between the floor and the small of your back, with your palm touching the floor and your back touching the back of your hand. 2. Tighten the thigh muscles in your affected leg by pressing the back of your knee flat down to the floor. Hold your knee straight. 3. Keeping the thigh muscles tight and your leg straight, lift your affected leg up so that your heel is about 12 inches off the floor.  Hold for about 6 seconds, then lower slowly. 4. Relax for up to 10 seconds between repetitions. 5. Repeat 8 to 12 times. 6. Switch legs and repeat steps 1 through 5, even if only one knee is sore. Active knee flexion   1. Lie on your stomach with your knees straight. If your kneecap is uncomfortable, roll up a washcloth and put it under your leg just above your kneecap. 2. Lift the foot of your affected leg by bending the knee so that you bring the foot up toward your buttock. If this motion hurts, try it without bending your knee quite as far. This may help you avoid any painful motion. 3. Slowly move your leg up and down. 4. Repeat 8 to 12 times. 5. Switch legs and repeat steps 1 through 4, even if only one knee is sore. Quadriceps stretch (facedown)   1. Lie flat on your stomach, and rest your face on the floor. 2. Wrap a towel or belt strap around the lower part of your affected leg. Then use the towel or belt strap to slowly pull your heel toward your buttock until you feel a stretch. 3. Hold for about 15 to 30 seconds, then relax your leg against the towel or belt strap. 4. Repeat 2 to 4 times. 5. Switch legs and repeat steps 1 through 4, even if only one knee is sore. Stationary exercise bike   1. If you do not have a stationary exercise bike at home, you can find one to ride at your local health club or community center. 2. Adjust the height of the bike seat so that your knee is slightly bent when your leg is extended downward. If your knee hurts when the pedal reaches the top, you can raise the seat so that your knee does not bend as much. 3. Start slowly. At first, try to do 5 to 10 minutes of cycling with little to no resistance. Then increase your time and the resistance bit by bit until you can do 20 to 30 minutes without pain. 4. If you start to have pain, rest your knee until your pain gets back to the level that is normal for you. Or cycle for less time or with less effort.     Follow-up care is a key part of your treatment and safety. Be sure to make and go to all appointments, and call your doctor if you are having problems. It's also a good idea to know your test results and keep a list of the medicines you take. Where can you learn more? Go to [email or web address] and sign in to your [employer] account. Enter C159 in the [employer] box to learn more about \"Knee Arthritis: Exercises. \"     If you do not have an account, please click on the \"Sign Up Now\" link. Current as of: March 2, 2020               Content Version: 12.6  © 3114-1723 [employer]. Care instructions adapted under license by [facility] ([facility]). If you have questions about a medical condition or this instruction, always ask your healthcare professional. [address] any warranty or liability for your use of this information.

## 2020-10-22 NOTE — PROGRESS NOTES
Rhea Caldwell MD    [address] [place] [address]  [id number] [address], [address]  [address] [address]  Dept: [phone]  Dept Fax: [phone]     Patient ID: Juanito Cheema is a 69 y.o. female. HPI    Pt here today for f/u on chronic medical problems HTN, Hyperglycemia, GERD, CKD, OA, Obesity,go over labs and/or diagnostic studies, and medication refills. Pt denies any fever or chills. Pt today denies any HA, chest pain, or SOB. Pt denies any N/V/D/C or abdominal pain. Pt with arthralgias on and off, but stable on meds. Pt with occasional heartburn, but stable on meds. Otherwise pt doing well on current tx and no other concerns today. The patient's past medical, surgical, social, and family history as well as his current medications and allergies were reviewed as documented in today's encounter. Current Outpatient Medications on File Prior to Visit   Medication Sig Dispense Refill    lisinopril-hydroCHLOROthiazide (PRINZIDE;ZESTORETIC) 20-12.5 MG per tablet Take 1 tablet by mouth once daily 90 tablet 3    omeprazole (PRILOSEC OTC) 20 MG tablet Take 20 mg by mouth every other day.  Cholecalciferol (VITAMIN D) 2000 UNIT CAPS capsule Take 1 capsule by mouth daily.  Ibuprofen (MOTRIN IB PO) Take 200 mg by mouth as needed.  Naproxen Sodium (ALEVE) 220 MG CAPS Take by mouth PRN      acyclovir (ZOVIRAX) 5 % ointment Apply topically every 3 hours. (Patient not taking: Reported on 4/21/2020) 45 g 1    Calcium Carbonate (CALTRATE 600 PO) Take 1 tablet by mouth daily. No current facility-administered medications on file prior to visit. Subjective:     Review of Systems   Constitutional: Negative for appetite change, fatigue and fever. HENT: Negative for congestion, ear pain, rhinorrhea and sore throat. Respiratory: Negative for cough, chest tightness and shortness of breath.     Cardiovascular: Negative for chest pain and palpitations. Gastrointestinal: Negative for abdominal distention, abdominal pain, constipation, diarrhea, nausea and vomiting. Occ heartburn   Genitourinary: Negative for difficulty urinating and dysuria. Musculoskeletal: Positive for arthralgias (both knees). Negative for back pain and myalgias. Skin: Negative for rash. Neurological: Negative for dizziness, weakness, light-headedness and headaches. Hematological: Negative for adenopathy. Psychiatric/Behavioral: Negative for behavioral problems and sleep disturbance. The patient is not nervous/anxious. Objective:     Physical Exam  Vitals signs reviewed. Constitutional:       General: She is not in acute distress. Appearance: She is well-developed. HENT:      Head: Normocephalic and atraumatic. Right Ear: External ear normal.      Left Ear: External ear normal.      Nose: Nose normal.      Mouth/Throat:      Pharynx: No oropharyngeal exudate. Eyes:      Conjunctiva/sclera: Conjunctivae normal.      Pupils: Pupils are equal, round, and reactive to light. Neck:      Musculoskeletal: Normal range of motion. Cardiovascular:      Rate and Rhythm: Normal rate and regular rhythm. Heart sounds: Normal heart sounds. No murmur. Pulmonary:      Effort: Pulmonary effort is normal. No respiratory distress. Breath sounds: Normal breath sounds. No wheezing. Chest:      Chest wall: No tenderness. Abdominal:      General: Bowel sounds are normal. There is no distension. Palpations: Abdomen is soft. There is no mass. Tenderness: There is no abdominal tenderness. Musculoskeletal: Normal range of motion. General: No tenderness. Lymphadenopathy:      Cervical: No cervical adenopathy. Skin:     Findings: No rash. Neurological:      Mental Status: She is alert and oriented to person, place, and time. Deep Tendon Reflexes: Reflexes are normal and symmetric.    Psychiatric:         Behavior: Behavior normal.       Orders Placed This Encounter   Procedures    INFLUENZA, QUADV, ADJUVANTED, 72 YRS =, IM, PF, PREFILL SYR, 0.5ML (FLUAD)    CBC     Standing Status:   Future     Standing Expiration Date:   10/22/2021    Basic Metabolic Panel     Standing Status:   Future     Standing Expiration Date:   10/22/2021    Hemoglobin A1C     Standing Status:   Future     Standing Expiration Date:   10/22/2021      No orders of the defined types were placed in this encounter. Assessment:      Diagnosis Orders   1. Essential hypertension  CBC    Basic Metabolic Panel   2. Hyperglycemia  Basic Metabolic Panel    Hemoglobin A1C   3. Stage 3a chronic kidney disease  Basic Metabolic Panel   4. Gastroesophageal reflux disease without esophagitis     5. Osteoarthritis, unspecified osteoarthritis type, unspecified site     6. Class 2 obesity due to excess calories without serious comorbidity with body mass index (BMI) of 38.0 to 38.9 in adult  Basic Metabolic Panel   7.  Need for vaccination  INFLUENZA, QUADV, ADJUVANTED, 72 YRS =, IM, PF, PREFILL SYR, 0.5ML (FLUAD)         Plan:     Orders Placed This Encounter   Procedures    INFLUENZA, QUADV, ADJUVANTED, 72 YRS =, IM, PF, PREFILL SYR, 0.5ML (FLUAD)    CBC     Standing Status:   Future     Standing Expiration Date:   10/22/2021    Basic Metabolic Panel     Standing Status:   Future     Standing Expiration Date:   10/22/2021    Hemoglobin A1C     Standing Status:   Future     Standing Expiration Date:   10/22/2021        Will cont with current treatment as pt is currently stable on current treatment    Continue to watch carbs secondary to increased Triglycerides and BS    Increase fluids throughout the day and NSAID's in moderation    Pt was given  strengthening and stretching exercises to do for her knees and cont with OTC NSAID prn in moderation    I did d/w pt about her diet, watch her carbs, and portion sizes    Rest of systems unchanged, continue current treatments. Medications, labs, diagnostic studies, consultations and follow-up as documented in this encounter. Rest of systems unchanged, continue current treatments    I have spent 15 minutes face to face with the patient more than 50% of this time was spent counseling and coordinating care. Rhea Jansen MD

## 2021-04-14 ENCOUNTER — HOSPITAL ENCOUNTER (OUTPATIENT)
Age: 76
Setting detail: SPECIMEN
Discharge: HOME | End: 2021-04-14
Payer: MEDICARE

## 2021-04-14 DIAGNOSIS — N18.31 STAGE 3A CHRONIC KIDNEY DISEASE (HCC): ICD-10-CM

## 2021-04-14 DIAGNOSIS — R73.9 HYPERGLYCEMIA: ICD-10-CM

## 2021-04-14 DIAGNOSIS — I10 ESSENTIAL HYPERTENSION: ICD-10-CM

## 2021-04-14 DIAGNOSIS — E66.09 CLASS 2 OBESITY DUE TO EXCESS CALORIES WITHOUT SERIOUS COMORBIDITY WITH BODY MASS INDEX (BMI) OF 38.0 TO 38.9 IN ADULT: ICD-10-CM

## 2021-04-14 LAB
ANION GAP SERPL CALCULATED.3IONS-SCNC: 12 MMOL/L (ref 9–17)
BUN BLDV-MCNC: 17 MG/DL (ref 8–23)
BUN/CREAT BLD: ABNORMAL (ref 9–20)
CALCIUM SERPL-MCNC: 9.7 MG/DL (ref 8.6–10.4)
CHLORIDE BLD-SCNC: 103 MMOL/L (ref 98–107)
CO2: 25 MMOL/L (ref 20–31)
CREAT SERPL-MCNC: 1.21 MG/DL (ref 0.5–0.9)
GFR AFRICAN AMERICAN: 53 ML/MIN
GFR NON-AFRICAN AMERICAN: 44 ML/MIN
GFR SERPL CREATININE-BSD FRML MDRD: ABNORMAL ML/MIN/{1.73_M2}
GFR SERPL CREATININE-BSD FRML MDRD: ABNORMAL ML/MIN/{1.73_M2}
GLUCOSE BLD-MCNC: 81 MG/DL (ref 70–99)
HCT VFR BLD CALC: 44.9 % (ref 36.3–47.1)
HEMOGLOBIN: 13.2 G/DL (ref 11.9–15.1)
MCH RBC QN AUTO: 27.3 PG (ref 25.2–33.5)
MCHC RBC AUTO-ENTMCNC: 29.4 G/DL (ref 28.4–34.8)
MCV RBC AUTO: 93 FL (ref 82.6–102.9)
NRBC AUTOMATED: 0 PER 100 WBC
PDW BLD-RTO: 14.5 % (ref 11.8–14.4)
PLATELET # BLD: 304 K/UL (ref 138–453)
PMV BLD AUTO: 12.5 FL (ref 8.1–13.5)
POTASSIUM SERPL-SCNC: 4.7 MMOL/L (ref 3.7–5.3)
RBC # BLD: 4.83 M/UL (ref 3.95–5.11)
SODIUM BLD-SCNC: 140 MMOL/L (ref 135–144)
WBC # BLD: 10 K/UL (ref 3.5–11.3)

## 2021-04-15 LAB
ESTIMATED AVERAGE GLUCOSE: 120 MG/DL
HBA1C MFR BLD: 5.8 % (ref 4–6)

## 2021-04-22 ENCOUNTER — OFFICE VISIT (OUTPATIENT)
Dept: FAMILY MEDICINE CLINIC | Age: 76
End: 2021-04-22
Payer: MEDICARE

## 2021-04-22 VITALS
BODY MASS INDEX: 37.35 KG/M2 | HEART RATE: 76 BPM | DIASTOLIC BLOOD PRESSURE: 72 MMHG | WEIGHT: 238 LBS | OXYGEN SATURATION: 98 % | RESPIRATION RATE: 16 BRPM | SYSTOLIC BLOOD PRESSURE: 114 MMHG | HEIGHT: 67 IN

## 2021-04-22 DIAGNOSIS — K21.9 GASTROESOPHAGEAL REFLUX DISEASE WITHOUT ESOPHAGITIS: ICD-10-CM

## 2021-04-22 DIAGNOSIS — Z23 NEED FOR PROPHYLACTIC VACCINATION AND INOCULATION AGAINST VARICELLA: ICD-10-CM

## 2021-04-22 DIAGNOSIS — I10 ESSENTIAL HYPERTENSION: Primary | ICD-10-CM

## 2021-04-22 DIAGNOSIS — R73.9 HYPERGLYCEMIA: ICD-10-CM

## 2021-04-22 DIAGNOSIS — Z13.220 SCREENING CHOLESTEROL LEVEL: ICD-10-CM

## 2021-04-22 DIAGNOSIS — Z12.11 SCREEN FOR COLON CANCER: ICD-10-CM

## 2021-04-22 DIAGNOSIS — M19.90 OSTEOARTHRITIS, UNSPECIFIED OSTEOARTHRITIS TYPE, UNSPECIFIED SITE: ICD-10-CM

## 2021-04-22 DIAGNOSIS — N18.32 STAGE 3B CHRONIC KIDNEY DISEASE (HCC): ICD-10-CM

## 2021-04-22 PROCEDURE — 1090F PRES/ABSN URINE INCON ASSESS: CPT | Performed by: FAMILY MEDICINE

## 2021-04-22 PROCEDURE — 3017F COLORECTAL CA SCREEN DOC REV: CPT | Performed by: FAMILY MEDICINE

## 2021-04-22 PROCEDURE — 4040F PNEUMOC VAC/ADMIN/RCVD: CPT | Performed by: FAMILY MEDICINE

## 2021-04-22 PROCEDURE — G8427 DOCREV CUR MEDS BY ELIG CLIN: HCPCS | Performed by: FAMILY MEDICINE

## 2021-04-22 PROCEDURE — 1036F TOBACCO NON-USER: CPT | Performed by: FAMILY MEDICINE

## 2021-04-22 PROCEDURE — G8417 CALC BMI ABV UP PARAM F/U: HCPCS | Performed by: FAMILY MEDICINE

## 2021-04-22 PROCEDURE — G8399 PT W/DXA RESULTS DOCUMENT: HCPCS | Performed by: FAMILY MEDICINE

## 2021-04-22 PROCEDURE — 99214 OFFICE O/P EST MOD 30 MIN: CPT | Performed by: FAMILY MEDICINE

## 2021-04-22 PROCEDURE — 1123F ACP DISCUSS/DSCN MKR DOCD: CPT | Performed by: FAMILY MEDICINE

## 2021-04-22 RX ORDER — LISINOPRIL AND HYDROCHLOROTHIAZIDE 20; 12.5 MG/1; MG/1
TABLET ORAL
Qty: 90 TABLET | Refills: 3 | Status: CANCELLED | OUTPATIENT
Start: 2021-04-22

## 2021-04-22 RX ORDER — LISINOPRIL 30 MG/1
30 TABLET ORAL DAILY
Qty: 90 TABLET | Refills: 3 | Status: SHIPPED | OUTPATIENT
Start: 2021-04-22 | End: 2021-05-17 | Stop reason: SINTOL

## 2021-04-22 SDOH — ECONOMIC STABILITY: INCOME INSECURITY: HOW HARD IS IT FOR YOU TO PAY FOR THE VERY BASICS LIKE FOOD, HOUSING, MEDICAL CARE, AND HEATING?: NOT HARD AT ALL

## 2021-04-22 SDOH — ECONOMIC STABILITY: FOOD INSECURITY: WITHIN THE PAST 12 MONTHS, THE FOOD YOU BOUGHT JUST DIDN'T LAST AND YOU DIDN'T HAVE MONEY TO GET MORE.: NEVER TRUE

## 2021-04-22 SDOH — ECONOMIC STABILITY: TRANSPORTATION INSECURITY
IN THE PAST 12 MONTHS, HAS THE LACK OF TRANSPORTATION KEPT YOU FROM MEDICAL APPOINTMENTS OR FROM GETTING MEDICATIONS?: NOT ASKED

## 2021-04-22 ASSESSMENT — ENCOUNTER SYMPTOMS
ABDOMINAL PAIN: 0
COUGH: 0
CONSTIPATION: 0
ABDOMINAL DISTENTION: 0
BACK PAIN: 0
RHINORRHEA: 0
SORE THROAT: 0
DIARRHEA: 0
SHORTNESS OF BREATH: 0
NAUSEA: 0
VOMITING: 0
CHEST TIGHTNESS: 0

## 2021-04-22 ASSESSMENT — PATIENT HEALTH QUESTIONNAIRE - PHQ9
SUM OF ALL RESPONSES TO PHQ QUESTIONS 1-9: 2
2. FEELING DOWN, DEPRESSED OR HOPELESS: 1
SUM OF ALL RESPONSES TO PHQ QUESTIONS 1-9: 2
1. LITTLE INTEREST OR PLEASURE IN DOING THINGS: 1

## 2021-04-22 NOTE — PROGRESS NOTES
Rhea Saleh MD    [address] [place] [address]  [id number]  [address] [address]  Dept: [phone]  Dept Fax: [phone]     Patient ID: Venus Antunez is a 70 y.o. female. HPI    Established patient here today for f/u on chronic medical problems, go over labs and/or diagnostic studies, and medication refills. Pt denies any fever or chills. Pt today denies any HA, chest pain, or SOB. Pt denies any N/V/D/C or abdominal pain. Pt with occasional heartburn, but stable on meds. Pt with arthralgias on and off, but stable on meds. Otherwise pt doing well on current tx and no other concerns today. The patient's past medical, surgical, social, and family history as well as his current medications and allergies were reviewed as documented in today's encounter. My previous office notes, labs and diagnostic studies were reviewed prior to and during encounter. Current Outpatient Medications on File Prior to Visit   Medication Sig Dispense Refill    Naproxen Sodium (ALEVE) 220 MG CAPS Take by mouth PRN      omeprazole (PRILOSEC OTC) 20 MG tablet Take 20 mg by mouth daily       Calcium Carbonate (CALTRATE 600 PO) Take 1 tablet by mouth daily.  Cholecalciferol (VITAMIN D) 2000 UNIT CAPS capsule Take 1 capsule by mouth daily.  Ibuprofen (MOTRIN IB PO) Take 200 mg by mouth as needed. No current facility-administered medications on file prior to visit. Subjective:     Review of Systems   Constitutional: Negative for appetite change, fatigue and fever. HENT: Negative for congestion, ear pain, rhinorrhea and sore throat. Respiratory: Negative for cough, chest tightness and shortness of breath. Cardiovascular: Positive for leg swelling (right ankle). Negative for chest pain and palpitations. Gastrointestinal: Negative for abdominal distention, abdominal pain, constipation, diarrhea, nausea and vomiting.         Occ heartburn   Genitourinary: Negative for difficulty urinating and dysuria. Musculoskeletal: Positive for arthralgias. Negative for back pain and myalgias. Skin: Negative for rash. Neurological: Negative for dizziness, weakness, light-headedness and headaches. Hematological: Negative for adenopathy. Psychiatric/Behavioral: Negative for behavioral problems and sleep disturbance. The patient is not nervous/anxious. Objective:     Physical Exam  Vitals signs reviewed. Constitutional:       General: She is not in acute distress. Appearance: She is well-developed. HENT:      Head: Normocephalic and atraumatic. Right Ear: External ear normal.      Left Ear: External ear normal.      Nose: Nose normal.      Mouth/Throat:      Pharynx: No oropharyngeal exudate. Eyes:      Conjunctiva/sclera: Conjunctivae normal.      Pupils: Pupils are equal, round, and reactive to light. Neck:      Musculoskeletal: Normal range of motion. Cardiovascular:      Rate and Rhythm: Normal rate and regular rhythm. Heart sounds: Normal heart sounds. No murmur. Pulmonary:      Effort: Pulmonary effort is normal. No respiratory distress. Breath sounds: Normal breath sounds. No wheezing. Chest:      Chest wall: No tenderness. Abdominal:      General: Bowel sounds are normal. There is no distension. Palpations: Abdomen is soft. There is no mass. Tenderness: There is no abdominal tenderness. Musculoskeletal: Normal range of motion. General: Swelling (right ankle) present. No tenderness. Lymphadenopathy:      Cervical: No cervical adenopathy. Skin:     Findings: No rash. Neurological:      Mental Status: She is alert and oriented to person, place, and time. Deep Tendon Reflexes: Reflexes are normal and symmetric. Psychiatric:         Behavior: Behavior normal.         Assessment:      Diagnosis Orders   1.  Essential hypertension  CBC    Comprehensive Metabolic Panel lisinopril (PRINIVIL;ZESTRIL) 30 MG tablet    Basic Metabolic Panel   2. Hyperglycemia  Comprehensive Metabolic Panel    Hemoglobin A1C   3. Stage 3b chronic kidney disease  Comprehensive Metabolic Panel    Basic Metabolic Panel   4. Gastroesophageal reflux disease without esophagitis     5. Osteoarthritis, unspecified osteoarthritis type, unspecified site     6. Screening cholesterol level  Lipid Panel   7. Need for prophylactic vaccination and inoculation against varicella  zoster recombinant adjuvanted vaccine (SHINGRIX) 50 MCG/0.5ML SUSR injection   8. Screen for colon cancer  Cologuard         Plan:     Orders Placed This Encounter   Procedures    Cologuard     This test is performed by an external laboratory and is used for result attachment only. It is required that this order requisition be faxed to: [facility] @@ [phone]. See [email or web address] for further information. Standing Status:   Future     Standing Expiration Date:   2022    CBC     Standing Status:   Future     Standing Expiration Date:   2022    Comprehensive Metabolic Panel     Standing Status:   Future     Standing Expiration Date:   2022    Hemoglobin A1C     Standing Status:   Future     Standing Expiration Date:   2022    Lipid Panel     Standing Status:   Future     Standing Expiration Date:   2022     Order Specific Question:   Is Patient Fasting?/# of Hours     Answer:   8-10 Hours    Basic Metabolic Panel     Standing Status:   Future     Standing Expiration Date:   2022      Orders Placed This Encounter   Medications    zoster recombinant adjuvanted vaccine (SHINGRIX) 50 MCG/0.5ML SUSR injection     Si MCG IM then repeat 2-6 months.      Dispense:  0.5 mL     Refill:  1    lisinopril (PRINIVIL;ZESTRIL) 30 MG tablet     Sig: Take 1 tablet by mouth daily     Dispense:  90 tablet     Refill:  3     Orders Placed This Encounter   Procedures    Cologuard     This test is performed by an external laboratory and is used for result attachment only. It is required that this order requisition be faxed to: [facility] @@ [phone]. See [email or web address] for further information. Standing Status:   Future     Standing Expiration Date:   2022    CBC     Standing Status:   Future     Standing Expiration Date:   2022    Comprehensive Metabolic Panel     Standing Status:   Future     Standing Expiration Date:   2022    Hemoglobin A1C     Standing Status:   Future     Standing Expiration Date:   2022    Lipid Panel     Standing Status:   Future     Standing Expiration Date:   2022     Order Specific Question:   Is Patient Fasting?/# of Hours     Answer:   8-10 Hours    Basic Metabolic Panel     Standing Status:   Future     Standing Expiration Date:   2022      Orders Placed This Encounter   Medications    zoster recombinant adjuvanted vaccine (SHINGRIX) 50 MCG/0.5ML SUSR injection     Si MCG IM then repeat 2-6 months. Dispense:  0.5 mL     Refill:  1    lisinopril (PRINIVIL;ZESTRIL) 30 MG tablet     Sig: Take 1 tablet by mouth daily     Dispense:  90 tablet     Refill:  3     Will stop the Prinzide secondary to worsening kidney function and change to just Lisinopril and increase to 30mg daily for BP control    Continue to watch carbs secondary to increased Triglycerides and BS    Colon cancer screening discussed with patient, pt has agreed to testing and Cologuard testing has been ordered     Rest of systems unchanged, continue current treatments. Medications, labs, diagnostic studies, consultations and follow-up as documented in this encounter.  Rest of systems unchanged, continue current treatments    On this date 2021,  I have spent greater than 50% of visit reviewing previous notes, test results and face to face with the patient discussing the diagnoses, importance of compliance with the treatment plan, counseling, coordinating care as well as documenting on the day of the visit. Rhea Nicole MD

## 2021-05-11 ASSESSMENT — LIFESTYLE VARIABLES
AUDIT TOTAL SCORE: INCOMPLETE
AUDIT-C TOTAL SCORE: INCOMPLETE
HOW OFTEN DO YOU HAVE A DRINK CONTAINING ALCOHOL: NEVER

## 2021-05-11 ASSESSMENT — PATIENT HEALTH QUESTIONNAIRE - PHQ9: 2. FEELING DOWN, DEPRESSED OR HOPELESS: 0

## 2021-05-12 ENCOUNTER — HOSPITAL ENCOUNTER (OUTPATIENT)
Age: 76
Setting detail: SPECIMEN
Discharge: HOME | End: 2021-05-12
Payer: MEDICARE

## 2021-05-12 DIAGNOSIS — I10 ESSENTIAL HYPERTENSION: ICD-10-CM

## 2021-05-12 DIAGNOSIS — N18.32 STAGE 3B CHRONIC KIDNEY DISEASE (HCC): ICD-10-CM

## 2021-05-12 LAB
ANION GAP SERPL CALCULATED.3IONS-SCNC: 13 MMOL/L (ref 9–17)
BUN BLDV-MCNC: 14 MG/DL (ref 8–23)
BUN/CREAT BLD: ABNORMAL (ref 9–20)
CALCIUM SERPL-MCNC: 9.3 MG/DL (ref 8.6–10.4)
CHLORIDE BLD-SCNC: 101 MMOL/L (ref 98–107)
CO2: 26 MMOL/L (ref 20–31)
CREAT SERPL-MCNC: 1.18 MG/DL (ref 0.5–0.9)
GFR AFRICAN AMERICAN: 54 ML/MIN
GFR NON-AFRICAN AMERICAN: 45 ML/MIN
GFR SERPL CREATININE-BSD FRML MDRD: ABNORMAL ML/MIN/{1.73_M2}
GFR SERPL CREATININE-BSD FRML MDRD: ABNORMAL ML/MIN/{1.73_M2}
GLUCOSE BLD-MCNC: 87 MG/DL (ref 70–99)
POTASSIUM SERPL-SCNC: 4.7 MMOL/L (ref 3.7–5.3)
SODIUM BLD-SCNC: 140 MMOL/L (ref 135–144)

## 2021-05-17 ENCOUNTER — OFFICE VISIT (OUTPATIENT)
Dept: FAMILY MEDICINE CLINIC | Age: 76
End: 2021-05-17
Payer: MEDICARE

## 2021-05-17 VITALS
RESPIRATION RATE: 16 BRPM | HEART RATE: 72 BPM | DIASTOLIC BLOOD PRESSURE: 86 MMHG | OXYGEN SATURATION: 98 % | HEIGHT: 64 IN | BODY MASS INDEX: 41.48 KG/M2 | WEIGHT: 243 LBS | SYSTOLIC BLOOD PRESSURE: 132 MMHG

## 2021-05-17 DIAGNOSIS — R05.8 COUGH DUE TO ACE INHIBITOR: Primary | ICD-10-CM

## 2021-05-17 DIAGNOSIS — T46.4X5A COUGH DUE TO ACE INHIBITOR: Primary | ICD-10-CM

## 2021-05-17 PROCEDURE — G0439 PPPS, SUBSEQ VISIT: HCPCS | Performed by: FAMILY MEDICINE

## 2021-05-17 PROCEDURE — 3017F COLORECTAL CA SCREEN DOC REV: CPT | Performed by: FAMILY MEDICINE

## 2021-05-17 PROCEDURE — 4040F PNEUMOC VAC/ADMIN/RCVD: CPT | Performed by: FAMILY MEDICINE

## 2021-05-17 PROCEDURE — 1123F ACP DISCUSS/DSCN MKR DOCD: CPT | Performed by: FAMILY MEDICINE

## 2021-05-17 RX ORDER — LOSARTAN POTASSIUM 100 MG/1
100 TABLET ORAL DAILY
Qty: 90 TABLET | Refills: 3 | Status: SHIPPED | OUTPATIENT
Start: 2021-05-17 | End: 2022-05-09 | Stop reason: SDUPTHER

## 2021-05-17 NOTE — PROGRESS NOTES
ENDOMETRIAL BIOPSY  8-2003    postmenopausal spotting    HYSTERECTOMY  2003    SETH,RSO due to Endometrial Cancer    LAPAROTOMY  1983    Pelvic Laparotomy with right ovarian resection due to endometriosis  With Left S&O         Family History   Problem Relation Age of Onset    Hypertension Father     Hypertension Mother     Diabetes Mother     Other Mother         CHF    Hypertension Maternal Grandmother     Hypertension Maternal Grandfather     Hypertension Paternal Grandmother     Other Paternal Grandfather         ruptured appendix       CareTeam (Including outside providers/suppliers regularly involved in providing care):   Patient Care Team:  Mary Ann Arriola MD as PCP - General (Family Medicine)  Mary Ann Arriola MD as PCP - [facility] Empaneled Provider  Gigi Phillips as Consulting Physician (Optometry)    Wt Readings from Last 3 Encounters:   05/17/21 243 lb (110.2 kg)   04/22/21 238 lb (108 kg)   10/22/20 240 lb 9.6 oz (109.1 kg)     Vitals:    05/17/21 1116   BP: 132/86   Pulse: 72   Resp: 16   SpO2: 98%   Weight: 243 lb (110.2 kg)   Height: 5' 4.25\" (1.632 m)     Body mass index is 41.39 kg/m². Based upon direct observation of the patient, evaluation of cognition reveals recent and remote memory intact. Patient's complete Health Risk Assessment and screening values have been reviewed and are found in Flowsheets. The following problems were reviewed today and where indicated follow up appointments were made and/or referrals ordered. Positive Risk Factor Screenings with Interventions:            General Health and ACP:  General  In general, how would you say your health is?: Good  In the past 7 days, have you experienced any of the following?  New or Increased Pain, New or Increased Fatigue, Loneliness, Social Isolation, Stress or Anger?: None of These  Do you get the social and emotional support that you need?: Yes  Do you have a Living Will?: (!) No  Advance Directives     Power of FIT/FOBT  04/12/2017    Annual Wellness Visit (AWV)  Never done    DTaP/Tdap/Td vaccine (1 - Tdap) 10/06/2021 (Originally 12/25/1964)    Shingles Vaccine (2 of 3) 05/17/2022 (Originally 5/28/2014)    Breast cancer screen  09/12/2021    A1C test (Diabetic or Prediabetic)  04/14/2022    Potassium monitoring  05/12/2022    Creatinine monitoring  05/12/2022    Lipid screen  10/08/2025    Flu vaccine  Completed    Pneumococcal 65+ years Vaccine  Completed    COVID-19 Vaccine  Completed    Hepatitis C screen  Completed    Hepatitis A vaccine  Aged Out    Hepatitis B vaccine  Aged Out    Hib vaccine  Aged Out    Meningococcal (ACWY) vaccine  Aged Out     Recommendations for [employer] Due: see orders and patient instructions/AVS.  . Recommended screening schedule for the next 5-10 years is provided to the patient in written form: see Patient Tami Childress was seen today for medicare awv. Diagnoses and all orders for this visit:    Cough due to ACE inhibitor    Other orders  -     losartan (COZAAR) 100 MG tablet; Take 1 tablet by mouth daily           Will stop the Lisinopril secondary to the cough and will start the Losartan 100mg  Daily    Rest of systems unchanged, continue current treatments. Medications, labs, diagnostic studies, consultations and follow-up as documented in this encounter.  Rest of systems unchanged, continue current treatments

## 2021-05-25 DIAGNOSIS — Z12.11 SCREEN FOR COLON CANCER: ICD-10-CM

## 2021-08-03 ENCOUNTER — VIRTUAL VISIT (OUTPATIENT)
Dept: FAMILY MEDICINE CLINIC | Age: 76
End: 2021-08-03
Payer: MEDICARE

## 2021-08-03 ENCOUNTER — TELEPHONE (OUTPATIENT)
Dept: FAMILY MEDICINE CLINIC | Age: 76
End: 2021-08-03

## 2021-08-03 DIAGNOSIS — N39.0 URINARY TRACT INFECTION WITHOUT HEMATURIA, SITE UNSPECIFIED: Primary | ICD-10-CM

## 2021-08-03 PROCEDURE — 1123F ACP DISCUSS/DSCN MKR DOCD: CPT | Performed by: NURSE PRACTITIONER

## 2021-08-03 PROCEDURE — G8427 DOCREV CUR MEDS BY ELIG CLIN: HCPCS | Performed by: NURSE PRACTITIONER

## 2021-08-03 PROCEDURE — 4040F PNEUMOC VAC/ADMIN/RCVD: CPT | Performed by: NURSE PRACTITIONER

## 2021-08-03 PROCEDURE — G8399 PT W/DXA RESULTS DOCUMENT: HCPCS | Performed by: NURSE PRACTITIONER

## 2021-08-03 PROCEDURE — 1090F PRES/ABSN URINE INCON ASSESS: CPT | Performed by: NURSE PRACTITIONER

## 2021-08-03 PROCEDURE — 99213 OFFICE O/P EST LOW 20 MIN: CPT | Performed by: NURSE PRACTITIONER

## 2021-08-03 PROCEDURE — 3017F COLORECTAL CA SCREEN DOC REV: CPT | Performed by: NURSE PRACTITIONER

## 2021-08-03 RX ORDER — CIPROFLOXACIN 500 MG/1
500 TABLET, FILM COATED ORAL 2 TIMES DAILY
Qty: 14 TABLET | Refills: 0 | Status: SHIPPED | OUTPATIENT
Start: 2021-08-03 | End: 2021-08-10

## 2021-08-03 ASSESSMENT — ENCOUNTER SYMPTOMS
DIARRHEA: 0
BACK PAIN: 0
SHORTNESS OF BREATH: 0
SORE THROAT: 0
COUGH: 0
NAUSEA: 0
RHINORRHEA: 0
CHEST TIGHTNESS: 0
ABDOMINAL PAIN: 0
VOMITING: 0
ABDOMINAL DISTENTION: 0
CONSTIPATION: 0

## 2021-08-03 NOTE — PROGRESS NOTES
Elle De La Fuente, APRN-CNP  [address] [facility]  [id number] [address]  [address] [address]  Dept: [phone]  Dept Fax: [phone]     PATIENT ID: Gilberto Greenwood is a 70 y.o. female. HPI:  Gilberto Greenwood is a 70 y.o. female who is an established patient presenting today via virtual visit complaining of urinary frequency, urgency and dysuria x 1 week, without flank pain, fever, chills, or abnormal vaginal discharge or bleeding. Pt today denies any HA, chest pain, or SOB. Pt denies any N/V/D/C or abdominal pain. Otherwise patient is doing well on current tx and voices no other concerns today. My previous office notes, labs and diagnostic studies were reviewed prior to and during encounter. The patient's past medical, surgical, social, and family history as well as current medications and allergies were reviewed as documented in today's encounter by SULMA Tomlinson. Current Outpatient Medications on File Prior to Visit   Medication Sig Dispense Refill    losartan (COZAAR) 100 MG tablet Take 1 tablet by mouth daily 90 tablet 3    zoster recombinant adjuvanted vaccine (SHINGRIX) 50 MCG/0.5ML SUSR injection 50 MCG IM then repeat 2-6 months. 0.5 mL 1    Naproxen Sodium (ALEVE) 220 MG CAPS Take by mouth PRN      omeprazole (PRILOSEC OTC) 20 MG tablet Take 20 mg by mouth daily       Calcium Carbonate (CALTRATE 600 PO) Take 1 tablet by mouth daily.  Cholecalciferol (VITAMIN D) 2000 UNIT CAPS capsule Take 1 capsule by mouth daily.  Ibuprofen (MOTRIN IB PO) Take 200 mg by mouth as needed. No current facility-administered medications on file prior to visit. SUBJECTIVE:     Review of Systems   Constitutional: Negative for activity change, fatigue and fever. HENT: Negative for congestion, ear pain, rhinorrhea and sore throat. Respiratory: Negative for cough, chest tightness and shortness of breath.     Cardiovascular: Negative for chest pain and palpitations. Gastrointestinal: Negative for abdominal distention, abdominal pain, constipation, diarrhea, nausea and vomiting. Endocrine: Negative for polydipsia, polyphagia and polyuria. Genitourinary: Positive for difficulty urinating, frequency and urgency. Negative for dysuria. Musculoskeletal: Negative for arthralgias, back pain and myalgias. Skin: Negative for rash. Neurological: Negative for dizziness, weakness, light-headedness and headaches. Hematological: Negative for adenopathy. Psychiatric/Behavioral: Negative for agitation and behavioral problems. The patient is not nervous/anxious. OBJECTIVE: There were no vitals taken for this visit. Physical Exam  Vitals reviewed: Vital signs unavailable, as this is a virtual visit. Constitutional:       General: She is not in acute distress. Appearance: Normal appearance. She is not ill-appearing or toxic-appearing. Pulmonary:      Effort: No tachypnea or accessory muscle usage. Comments: Patient able to talk in full sentences without difficulty   Neurological:      General: No focal deficit present. Mental Status: She is alert and oriented to person, place, and time. Psychiatric:         Mood and Affect: Mood normal.         Speech: Speech normal.         Behavior: Behavior normal. Behavior is cooperative. ASSESSMENT:   Diagnosis Orders   1. Urinary tract infection without hematuria, site unspecified  ciprofloxacin (CIPRO) 500 MG tablet      PLAN:  1. Urinary tract infection without hematuria, site unspecified  - Symptoms consistent with UTI  - Increase water/cranberry juice intake,   - Monitor self closely for fever, chills  - Notify the office if s/s have not resolved within 7 days  - Avoid baths, proper wiping discussed, white cotton panties encouraged, do not hold urine at any time   - ciprofloxacin (CIPRO) 500 MG tablet;  Take 1 tablet by mouth 2 times daily for 7 days  Dispense: 14 tablet; Refill: 0     - On this date August 3, 2021,  I have spent greater than 50% of this visit reviewing previous notes, test results and face to face with the patient discussing the diagnoses, importance of compliance with the treatment plan, counseling, coordinating care as well as documenting on the day of the visit. Sho Galindo, was evaluated through a synchronous (real-time) audio-video encounter. The patient (or guardian if applicable) is aware that this is a billable service. Verbal consent to proceed has been obtained within the past 12 months. The visit was conducted pursuant to the emergency declaration under the [address] [place] [address], [address] [place] [address] authority and the [employer] General Act. Patient identification was verified, and a caregiver was present when appropriate. The patient was located in a state where the provider was credentialed to provide care. Total time spent for this encounter: Not billed by time    --ESTRADA Tucker NP on 8/3/2021 at 1:41 PM    An electronic signature was used to authenticate this note.          ESTRADA Bennett-CNP

## 2021-08-03 NOTE — TELEPHONE ENCOUNTER
Please let her know that Dr Devorah Ibrahim is out of the office this week. Can we offer her an appointment, either in office or virtual please.

## 2021-10-13 ENCOUNTER — HOSPITAL ENCOUNTER (OUTPATIENT)
Dept: WOMENS IMAGING | Age: 76
Discharge: HOME | End: 2021-10-15
Payer: MEDICARE

## 2021-10-13 DIAGNOSIS — Z12.31 ENCOUNTER FOR SCREENING MAMMOGRAM FOR MALIGNANT NEOPLASM OF BREAST: ICD-10-CM

## 2021-10-13 PROCEDURE — 77063 BREAST TOMOSYNTHESIS BI: CPT

## 2021-10-18 ENCOUNTER — TELEPHONE (OUTPATIENT)
Dept: FAMILY MEDICINE CLINIC | Age: 76
End: 2021-10-18

## 2021-10-18 NOTE — TELEPHONE ENCOUNTER
Letter written and message left on patients answering machine that it is ready and she can pick it up at her appointment on 10/22/21.

## 2021-10-18 NOTE — LETTER
[facility]  [address] [place] [address] [address]  [address] [address]  Phone: [phone]  Fax: [phone]    Jes Dukes MD        October 18, 2021     Patient: Carlito Martinez   YOB: 1945   Date of Visit: 10/18/2021       To Whom It May Concern: It is my medical opinion that Araceli Espinal is unable to perform jury duty at this time due to multiple chronic issues that preclude her from sitting/standing for any length of time. .    If you have any questions or concerns, please don't hesitate to call.     Sincerely,          Jes Dukes MD

## 2021-10-18 NOTE — TELEPHONE ENCOUNTER
Patient called states she is scheduled to be on Jury duty on 11/22/21,11/23/21. Patient needs a letter saying she is uable b/c she can't sit for a long length of time.

## 2021-10-19 ENCOUNTER — HOSPITAL ENCOUNTER (OUTPATIENT)
Age: 76
Setting detail: SPECIMEN
Discharge: HOME | End: 2021-10-19
Payer: MEDICARE

## 2021-10-19 DIAGNOSIS — R73.9 HYPERGLYCEMIA: ICD-10-CM

## 2021-10-19 DIAGNOSIS — N18.32 STAGE 3B CHRONIC KIDNEY DISEASE (HCC): ICD-10-CM

## 2021-10-19 DIAGNOSIS — I10 ESSENTIAL HYPERTENSION: ICD-10-CM

## 2021-10-19 DIAGNOSIS — Z13.220 SCREENING CHOLESTEROL LEVEL: ICD-10-CM

## 2021-10-19 LAB
ALBUMIN SERPL-MCNC: 4 G/DL (ref 3.5–5.2)
ALBUMIN/GLOBULIN RATIO: 1.1 (ref 1–2.5)
ALP BLD-CCNC: 54 U/L (ref 35–104)
ALT SERPL-CCNC: 12 U/L (ref 5–33)
ANION GAP SERPL CALCULATED.3IONS-SCNC: 19 MMOL/L (ref 9–17)
AST SERPL-CCNC: 17 U/L
BILIRUB SERPL-MCNC: 0.55 MG/DL (ref 0.3–1.2)
BUN BLDV-MCNC: 15 MG/DL (ref 8–23)
BUN/CREAT BLD: ABNORMAL (ref 9–20)
CALCIUM SERPL-MCNC: 9.5 MG/DL (ref 8.6–10.4)
CHLORIDE BLD-SCNC: 103 MMOL/L (ref 98–107)
CHOLESTEROL/HDL RATIO: 2.7
CHOLESTEROL: 136 MG/DL
CO2: 20 MMOL/L (ref 20–31)
CREAT SERPL-MCNC: 1.13 MG/DL (ref 0.5–0.9)
ESTIMATED AVERAGE GLUCOSE: 111 MG/DL
GFR AFRICAN AMERICAN: 57 ML/MIN
GFR NON-AFRICAN AMERICAN: 47 ML/MIN
GFR SERPL CREATININE-BSD FRML MDRD: ABNORMAL ML/MIN/{1.73_M2}
GFR SERPL CREATININE-BSD FRML MDRD: ABNORMAL ML/MIN/{1.73_M2}
GLUCOSE BLD-MCNC: 101 MG/DL (ref 70–99)
HBA1C MFR BLD: 5.5 % (ref 4–6)
HCT VFR BLD CALC: 44.2 % (ref 36.3–47.1)
HDLC SERPL-MCNC: 50 MG/DL
HEMOGLOBIN: 13 G/DL (ref 11.9–15.1)
LDL CHOLESTEROL: 65 MG/DL (ref 0–130)
MCH RBC QN AUTO: 26.9 PG (ref 25.2–33.5)
MCHC RBC AUTO-ENTMCNC: 29.4 G/DL (ref 28.4–34.8)
MCV RBC AUTO: 91.3 FL (ref 82.6–102.9)
NRBC AUTOMATED: 0 PER 100 WBC
PDW BLD-RTO: 14.5 % (ref 11.8–14.4)
PLATELET # BLD: 315 K/UL (ref 138–453)
PMV BLD AUTO: 12.7 FL (ref 8.1–13.5)
POTASSIUM SERPL-SCNC: 4.7 MMOL/L (ref 3.7–5.3)
RBC # BLD: 4.84 M/UL (ref 3.95–5.11)
SODIUM BLD-SCNC: 142 MMOL/L (ref 135–144)
TOTAL PROTEIN: 7.5 G/DL (ref 6.4–8.3)
TRIGL SERPL-MCNC: 104 MG/DL
VLDLC SERPL CALC-MCNC: NORMAL MG/DL (ref 1–30)
WBC # BLD: 9.3 K/UL (ref 3.5–11.3)

## 2021-10-22 ENCOUNTER — OFFICE VISIT (OUTPATIENT)
Dept: FAMILY MEDICINE CLINIC | Age: 76
End: 2021-10-22
Payer: MEDICARE

## 2021-10-22 VITALS
DIASTOLIC BLOOD PRESSURE: 84 MMHG | TEMPERATURE: 96.9 F | RESPIRATION RATE: 16 BRPM | OXYGEN SATURATION: 98 % | BODY MASS INDEX: 40.71 KG/M2 | SYSTOLIC BLOOD PRESSURE: 136 MMHG | HEART RATE: 70 BPM | WEIGHT: 239 LBS

## 2021-10-22 DIAGNOSIS — R73.9 HYPERGLYCEMIA: ICD-10-CM

## 2021-10-22 DIAGNOSIS — N18.32 STAGE 3B CHRONIC KIDNEY DISEASE (HCC): ICD-10-CM

## 2021-10-22 DIAGNOSIS — R53.83 FATIGUE, UNSPECIFIED TYPE: ICD-10-CM

## 2021-10-22 DIAGNOSIS — Z23 NEED FOR INFLUENZA VACCINATION: ICD-10-CM

## 2021-10-22 DIAGNOSIS — M19.90 OSTEOARTHRITIS, UNSPECIFIED OSTEOARTHRITIS TYPE, UNSPECIFIED SITE: ICD-10-CM

## 2021-10-22 DIAGNOSIS — E66.01 OBESITY, CLASS III, BMI 40-49.9 (MORBID OBESITY) (HCC): ICD-10-CM

## 2021-10-22 DIAGNOSIS — I10 ESSENTIAL HYPERTENSION: Primary | ICD-10-CM

## 2021-10-22 DIAGNOSIS — Z85.42 HISTORY OF ENDOMETRIAL CANCER: ICD-10-CM

## 2021-10-22 DIAGNOSIS — K21.9 GASTROESOPHAGEAL REFLUX DISEASE WITHOUT ESOPHAGITIS: ICD-10-CM

## 2021-10-22 DIAGNOSIS — M25.471 RIGHT ANKLE SWELLING: ICD-10-CM

## 2021-10-22 PROCEDURE — 1090F PRES/ABSN URINE INCON ASSESS: CPT | Performed by: FAMILY MEDICINE

## 2021-10-22 PROCEDURE — 99214 OFFICE O/P EST MOD 30 MIN: CPT | Performed by: FAMILY MEDICINE

## 2021-10-22 PROCEDURE — 4040F PNEUMOC VAC/ADMIN/RCVD: CPT | Performed by: FAMILY MEDICINE

## 2021-10-22 PROCEDURE — G8417 CALC BMI ABV UP PARAM F/U: HCPCS | Performed by: FAMILY MEDICINE

## 2021-10-22 PROCEDURE — G8427 DOCREV CUR MEDS BY ELIG CLIN: HCPCS | Performed by: FAMILY MEDICINE

## 2021-10-22 PROCEDURE — G8399 PT W/DXA RESULTS DOCUMENT: HCPCS | Performed by: FAMILY MEDICINE

## 2021-10-22 PROCEDURE — 3017F COLORECTAL CA SCREEN DOC REV: CPT | Performed by: FAMILY MEDICINE

## 2021-10-22 PROCEDURE — G0008 ADMIN INFLUENZA VIRUS VAC: HCPCS | Performed by: FAMILY MEDICINE

## 2021-10-22 PROCEDURE — G8484 FLU IMMUNIZE NO ADMIN: HCPCS | Performed by: FAMILY MEDICINE

## 2021-10-22 PROCEDURE — 1036F TOBACCO NON-USER: CPT | Performed by: FAMILY MEDICINE

## 2021-10-22 PROCEDURE — 1123F ACP DISCUSS/DSCN MKR DOCD: CPT | Performed by: FAMILY MEDICINE

## 2021-10-22 PROCEDURE — 90694 VACC AIIV4 NO PRSRV 0.5ML IM: CPT | Performed by: FAMILY MEDICINE

## 2021-10-22 ASSESSMENT — ENCOUNTER SYMPTOMS
VOMITING: 0
NAUSEA: 0
BACK PAIN: 0
RHINORRHEA: 0
ABDOMINAL DISTENTION: 0
DIARRHEA: 0
SHORTNESS OF BREATH: 0
CHEST TIGHTNESS: 0
COUGH: 0
SORE THROAT: 0
ABDOMINAL PAIN: 0
CONSTIPATION: 0

## 2021-10-22 NOTE — PROGRESS NOTES
Rhea Conklin MD    [address] [place] [address]  [id number] [address]  [address] [address]  Dept: [phone]  Dept Fax: [phone]     Patient ID: Radha Acosta is a 70 y.o. female. HPI    Established patient here today for f/u on chronic medical problems, go over labs and/or diagnostic studies, and medication refills. Pt denies any fever or chills. Pt today denies any HA, chest pain, or SOB. Pt denies any N/V/D/C or abdominal pain. Pt has been c/o fatigue. Pt with arthralgias on and off, but stable on meds. Pt with occasional heartburn, but stable on meds. Pt does c/o right ankle swelling. Otherwise pt doing well on current tx and no other concerns today. The patient's past medical, surgical, social, and family history as well as his current medications and allergies were reviewed as documented in today's encounter. My previous office notes, consult notes, labs and diagnostic studies were reviewed prior to and during encounter. Current Outpatient Medications on File Prior to Visit   Medication Sig Dispense Refill    losartan (COZAAR) 100 MG tablet Take 1 tablet by mouth daily 90 tablet 3    Naproxen Sodium (ALEVE) 220 MG CAPS Take by mouth PRN      omeprazole (PRILOSEC OTC) 20 MG tablet Take 20 mg by mouth daily       Calcium Carbonate (CALTRATE 600 PO) Take 1 tablet by mouth daily.  Cholecalciferol (VITAMIN D) 2000 UNIT CAPS capsule Take 1 capsule by mouth daily.  Ibuprofen (MOTRIN IB PO) Take 200 mg by mouth as needed.  zoster recombinant adjuvanted vaccine (SHINGRIX) 50 MCG/0.5ML SUSR injection 50 MCG IM then repeat 2-6 months. 0.5 mL 1     No current facility-administered medications on file prior to visit. Subjective:     Review of Systems   Constitutional: Positive for fatigue. Negative for appetite change and fever. HENT: Negative for congestion, ear pain, rhinorrhea and sore throat.     Respiratory: Negative for cough, chest tightness and shortness of breath. Cardiovascular: Positive for leg swelling (righ ankle). Negative for chest pain and palpitations. Gastrointestinal: Negative for abdominal distention, abdominal pain, constipation, diarrhea, nausea and vomiting. Genitourinary: Negative for difficulty urinating and dysuria. Musculoskeletal: Positive for arthralgias. Negative for back pain and myalgias. Skin: Negative for rash. Neurological: Negative for dizziness, weakness, light-headedness and headaches. Hematological: Negative for adenopathy. Psychiatric/Behavioral: Negative for behavioral problems and sleep disturbance. The patient is not nervous/anxious. Objective:     Physical Exam  Vitals reviewed. Constitutional:       General: She is not in acute distress. Appearance: She is well-developed. HENT:      Head: Normocephalic and atraumatic. Right Ear: External ear normal.      Left Ear: External ear normal.      Nose: Nose normal.      Mouth/Throat:      Pharynx: No oropharyngeal exudate. Eyes:      Conjunctiva/sclera: Conjunctivae normal.      Pupils: Pupils are equal, round, and reactive to light. Cardiovascular:      Rate and Rhythm: Normal rate and regular rhythm. Heart sounds: Normal heart sounds. No murmur heard. Pulmonary:      Effort: Pulmonary effort is normal. No respiratory distress. Breath sounds: Normal breath sounds. No wheezing. Chest:      Chest wall: No tenderness. Abdominal:      General: Bowel sounds are normal. There is no distension. Palpations: Abdomen is soft. There is no mass. Tenderness: There is no abdominal tenderness. Musculoskeletal:         General: Swelling (right ankle - mild) present. No tenderness. Normal range of motion. Cervical back: Normal range of motion. Lymphadenopathy:      Cervical: No cervical adenopathy. Skin:     Findings: No rash.    Neurological:      Mental Status: She is alert swelling in her right ankle    Rest of systems unchanged, continue current treatments. Medications, labs, diagnostic studies, consultations and follow-up as documented in this encounter. Rest of systems unchanged, continue current treatments    On this date October 22, 2021,  I have spent greater than 50% of visit reviewing previous notes, test results and face to face with the patient discussing the diagnoses, importance of compliance with the treatment plan, counseling, coordinating care as well as documenting on the day of the visit. Rhea Wall MD

## 2022-04-14 ENCOUNTER — HOSPITAL ENCOUNTER (OUTPATIENT)
Age: 77
Setting detail: SPECIMEN
Discharge: HOME | End: 2022-04-14

## 2022-04-14 DIAGNOSIS — R53.83 FATIGUE, UNSPECIFIED TYPE: ICD-10-CM

## 2022-04-14 DIAGNOSIS — I10 ESSENTIAL HYPERTENSION: ICD-10-CM

## 2022-04-14 DIAGNOSIS — N18.32 STAGE 3B CHRONIC KIDNEY DISEASE (HCC): ICD-10-CM

## 2022-04-14 DIAGNOSIS — R73.9 HYPERGLYCEMIA: ICD-10-CM

## 2022-04-14 LAB
ALBUMIN SERPL-MCNC: 3.9 G/DL (ref 3.5–5.2)
ALBUMIN/GLOBULIN RATIO: 1.2 (ref 1–2.5)
ALP BLD-CCNC: 54 U/L (ref 35–104)
ALT SERPL-CCNC: 12 U/L (ref 5–33)
ANION GAP SERPL CALCULATED.3IONS-SCNC: 15 MMOL/L (ref 9–17)
AST SERPL-CCNC: 18 U/L
BILIRUB SERPL-MCNC: 0.56 MG/DL (ref 0.3–1.2)
BUN BLDV-MCNC: 15 MG/DL (ref 8–23)
CALCIUM SERPL-MCNC: 9.3 MG/DL (ref 8.6–10.4)
CHLORIDE BLD-SCNC: 103 MMOL/L (ref 98–107)
CO2: 18 MMOL/L (ref 20–31)
CREAT SERPL-MCNC: 1 MG/DL (ref 0.5–0.9)
ESTIMATED AVERAGE GLUCOSE: 117 MG/DL
GFR AFRICAN AMERICAN: >60 ML/MIN
GFR NON-AFRICAN AMERICAN: 54 ML/MIN
GFR SERPL CREATININE-BSD FRML MDRD: ABNORMAL ML/MIN/{1.73_M2}
GLUCOSE BLD-MCNC: 75 MG/DL (ref 70–99)
HBA1C MFR BLD: 5.7 % (ref 4–6)
HCT VFR BLD CALC: 43.6 % (ref 36.3–47.1)
HEMOGLOBIN: 13.1 G/DL (ref 11.9–15.1)
MCH RBC QN AUTO: 27.8 PG (ref 25.2–33.5)
MCHC RBC AUTO-ENTMCNC: 30 G/DL (ref 28.4–34.8)
MCV RBC AUTO: 92.4 FL (ref 82.6–102.9)
NRBC AUTOMATED: 0 PER 100 WBC
PDW BLD-RTO: 14.6 % (ref 11.8–14.4)
PLATELET # BLD: ABNORMAL K/UL (ref 138–453)
PLATELET, FLUORESCENCE: 288 K/UL (ref 138–453)
PLATELET, IMMATURE FRACTION: 11.1 % (ref 1.1–10.3)
POTASSIUM SERPL-SCNC: 4.6 MMOL/L (ref 3.7–5.3)
RBC # BLD: 4.72 M/UL (ref 3.95–5.11)
SODIUM BLD-SCNC: 136 MMOL/L (ref 135–144)
THYROXINE, FREE: 1.17 NG/DL (ref 0.93–1.7)
TOTAL PROTEIN: 7.2 G/DL (ref 6.4–8.3)
TSH SERPL DL<=0.05 MIU/L-ACNC: 1.55 UIU/ML (ref 0.3–5)
VITAMIN B-12: 277 PG/ML (ref 232–1245)
WBC # BLD: 10.1 K/UL (ref 3.5–11.3)

## 2022-05-09 ENCOUNTER — OFFICE VISIT (OUTPATIENT)
Dept: FAMILY MEDICINE CLINIC | Age: 77
End: 2022-05-09
Payer: MEDICARE

## 2022-05-09 VITALS
OXYGEN SATURATION: 98 % | HEIGHT: 67 IN | TEMPERATURE: 97.8 F | BODY MASS INDEX: 37.51 KG/M2 | HEART RATE: 70 BPM | WEIGHT: 239 LBS | DIASTOLIC BLOOD PRESSURE: 78 MMHG | SYSTOLIC BLOOD PRESSURE: 118 MMHG | RESPIRATION RATE: 16 BRPM

## 2022-05-09 DIAGNOSIS — N18.31 STAGE 3A CHRONIC KIDNEY DISEASE (HCC): ICD-10-CM

## 2022-05-09 DIAGNOSIS — K21.9 GASTROESOPHAGEAL REFLUX DISEASE WITHOUT ESOPHAGITIS: ICD-10-CM

## 2022-05-09 DIAGNOSIS — Z23 NEED FOR SHINGLES VACCINE: ICD-10-CM

## 2022-05-09 DIAGNOSIS — Z13.220 SCREENING CHOLESTEROL LEVEL: ICD-10-CM

## 2022-05-09 DIAGNOSIS — I10 ESSENTIAL HYPERTENSION: Primary | ICD-10-CM

## 2022-05-09 DIAGNOSIS — R73.9 HYPERGLYCEMIA: ICD-10-CM

## 2022-05-09 DIAGNOSIS — M19.90 OSTEOARTHRITIS, UNSPECIFIED OSTEOARTHRITIS TYPE, UNSPECIFIED SITE: ICD-10-CM

## 2022-05-09 DIAGNOSIS — Z85.42 HISTORY OF ENDOMETRIAL CANCER: ICD-10-CM

## 2022-05-09 PROCEDURE — 99214 OFFICE O/P EST MOD 30 MIN: CPT | Performed by: FAMILY MEDICINE

## 2022-05-09 PROCEDURE — G8427 DOCREV CUR MEDS BY ELIG CLIN: HCPCS | Performed by: FAMILY MEDICINE

## 2022-05-09 PROCEDURE — G8417 CALC BMI ABV UP PARAM F/U: HCPCS | Performed by: FAMILY MEDICINE

## 2022-05-09 PROCEDURE — 1123F ACP DISCUSS/DSCN MKR DOCD: CPT | Performed by: FAMILY MEDICINE

## 2022-05-09 PROCEDURE — 4040F PNEUMOC VAC/ADMIN/RCVD: CPT | Performed by: FAMILY MEDICINE

## 2022-05-09 PROCEDURE — 3017F COLORECTAL CA SCREEN DOC REV: CPT | Performed by: FAMILY MEDICINE

## 2022-05-09 PROCEDURE — 1036F TOBACCO NON-USER: CPT | Performed by: FAMILY MEDICINE

## 2022-05-09 PROCEDURE — 1090F PRES/ABSN URINE INCON ASSESS: CPT | Performed by: FAMILY MEDICINE

## 2022-05-09 PROCEDURE — G8399 PT W/DXA RESULTS DOCUMENT: HCPCS | Performed by: FAMILY MEDICINE

## 2022-05-09 RX ORDER — ZOSTER VACCINE RECOMBINANT, ADJUVANTED 50 MCG/0.5
0.5 KIT INTRAMUSCULAR SEE ADMIN INSTRUCTIONS
Qty: 0.5 ML | Refills: 0 | Status: SHIPPED | OUTPATIENT
Start: 2022-05-09 | End: 2022-11-05

## 2022-05-09 RX ORDER — LOSARTAN POTASSIUM 100 MG/1
100 TABLET ORAL DAILY
Qty: 90 TABLET | Refills: 3 | Status: SHIPPED | OUTPATIENT
Start: 2022-05-09

## 2022-05-09 SDOH — ECONOMIC STABILITY: FOOD INSECURITY: WITHIN THE PAST 12 MONTHS, THE FOOD YOU BOUGHT JUST DIDN'T LAST AND YOU DIDN'T HAVE MONEY TO GET MORE.: NEVER TRUE

## 2022-05-09 SDOH — ECONOMIC STABILITY: FOOD INSECURITY: WITHIN THE PAST 12 MONTHS, YOU WORRIED THAT YOUR FOOD WOULD RUN OUT BEFORE YOU GOT MONEY TO BUY MORE.: NEVER TRUE

## 2022-05-09 ASSESSMENT — ENCOUNTER SYMPTOMS
ABDOMINAL PAIN: 0
BACK PAIN: 0
COUGH: 0
VOMITING: 0
DIARRHEA: 0
CONSTIPATION: 0
SORE THROAT: 0
CHEST TIGHTNESS: 0
SHORTNESS OF BREATH: 0
RHINORRHEA: 0
NAUSEA: 0
ABDOMINAL DISTENTION: 0

## 2022-05-09 ASSESSMENT — PATIENT HEALTH QUESTIONNAIRE - PHQ9
SUM OF ALL RESPONSES TO PHQ9 QUESTIONS 1 & 2: 2
SUM OF ALL RESPONSES TO PHQ QUESTIONS 1-9: 2
SUM OF ALL RESPONSES TO PHQ QUESTIONS 1-9: 2
2. FEELING DOWN, DEPRESSED OR HOPELESS: 1
SUM OF ALL RESPONSES TO PHQ QUESTIONS 1-9: 2
1. LITTLE INTEREST OR PLEASURE IN DOING THINGS: 1
SUM OF ALL RESPONSES TO PHQ QUESTIONS 1-9: 2

## 2022-05-09 ASSESSMENT — SOCIAL DETERMINANTS OF HEALTH (SDOH): HOW HARD IS IT FOR YOU TO PAY FOR THE VERY BASICS LIKE FOOD, HOUSING, MEDICAL CARE, AND HEATING?: NOT HARD AT ALL

## 2022-05-09 NOTE — PROGRESS NOTES
Rhea Smith MD    [address] [place] [address]  [id number] [address], [address]  [address] [address]  Dept: [phone]  Dept Fax: [phone]     Patient ID: Mónica Lynn is a 78 y.o. female. HPI    Established patient here today for f/u on chronic medical problems, go over labs and/or diagnostic studies, and medication refills. Pt denies any fever or chills. Pt today denies any HA, chest pain, or SOB. Pt denies any N/V/D/C or abdominal pain. Pt has been c/o fatigue. Pt with arthralgias on and off, but stable on meds. Pt with occasional heartburn, but stable on meds. Pt states she does not feel as tired. Otherwise pt doing well on current tx and no other concerns today. The patient's past medical, surgical, social, and family history as well as his current medications and allergies were reviewed as documented in today's encounter. My previous office notes, consult notes, labs and diagnostic studies were reviewed prior to and during encounter. Current Outpatient Medications on File Prior to Visit   Medication Sig Dispense Refill    Naproxen Sodium (ALEVE) 220 MG CAPS Take by mouth PRN      omeprazole (PRILOSEC OTC) 20 MG tablet Take 20 mg by mouth daily       Calcium Carbonate (CALTRATE 600 PO) Take 1 tablet by mouth daily.  Cholecalciferol (VITAMIN D) 2000 UNIT CAPS capsule Take 1 capsule by mouth daily.  Ibuprofen (MOTRIN IB PO) Take 200 mg by mouth as needed. No current facility-administered medications on file prior to visit. Subjective:     Review of Systems   Constitutional: Positive for fatigue (improved). Negative for appetite change and fever. HENT: Negative for congestion, ear pain, rhinorrhea and sore throat. Respiratory: Negative for cough, chest tightness and shortness of breath. Cardiovascular: Negative for chest pain and palpitations.    Gastrointestinal: Negative for abdominal distention, abdominal pain, constipation, diarrhea, nausea and vomiting. Genitourinary: Negative for difficulty urinating and dysuria. Musculoskeletal: Positive for arthralgias. Negative for back pain and myalgias. Skin: Negative for rash. Neurological: Negative for dizziness, weakness, light-headedness and headaches. Hematological: Negative for adenopathy. Psychiatric/Behavioral: Negative for behavioral problems and sleep disturbance. The patient is not nervous/anxious. Objective:     Physical Exam  Vitals reviewed. Constitutional:       General: She is not in acute distress. Appearance: She is well-developed. HENT:      Head: Normocephalic and atraumatic. Right Ear: External ear normal.      Left Ear: External ear normal.      Nose: Nose normal.      Mouth/Throat:      Pharynx: No oropharyngeal exudate. Eyes:      Conjunctiva/sclera: Conjunctivae normal.      Pupils: Pupils are equal, round, and reactive to light. Cardiovascular:      Rate and Rhythm: Normal rate and regular rhythm. Heart sounds: Normal heart sounds. No murmur heard. Pulmonary:      Effort: Pulmonary effort is normal. No respiratory distress. Breath sounds: Normal breath sounds. No wheezing. Chest:      Chest wall: No tenderness. Abdominal:      General: Bowel sounds are normal. There is no distension. Palpations: Abdomen is soft. There is no mass. Tenderness: There is no abdominal tenderness. Musculoskeletal:         General: No tenderness. Normal range of motion. Cervical back: Normal range of motion. Lymphadenopathy:      Cervical: No cervical adenopathy. Skin:     Findings: No rash. Neurological:      Mental Status: She is alert and oriented to person, place, and time. Deep Tendon Reflexes: Reflexes are normal and symmetric. Psychiatric:         Behavior: Behavior normal.         Assessment:      Diagnosis Orders   1.  Essential hypertension  CBC    Comprehensive Metabolic Panel losartan (COZAAR) 100 MG tablet   2. Hyperglycemia  Comprehensive Metabolic Panel    Hemoglobin A1C   3. Gastroesophageal reflux disease without esophagitis     4. Osteoarthritis, unspecified osteoarthritis type, unspecified site     5. History of endometrial cancer     6. Screening cholesterol level  Lipid Panel   7. Stage 3a chronic kidney disease (HCC)  Comprehensive Metabolic Panel   8. Need for shingles vaccine  zoster recombinant adjuvanted vaccine [facility]) 50 MCG/0.5ML SUSR injection         Plan:     Orders Placed This Encounter   Procedures    CBC     Standing Status:   Future     Standing Expiration Date:   5/9/2023    Comprehensive Metabolic Panel     Standing Status:   Future     Standing Expiration Date:   5/9/2023    Lipid Panel     Standing Status:   Future     Standing Expiration Date:   5/9/2023     Order Specific Question:   Is Patient Fasting?/# of Hours     Answer:   8-10 Hours    Hemoglobin A1C     Standing Status:   Future     Standing Expiration Date:   5/9/2023      Orders Placed This Encounter   Medications    losartan (COZAAR) 100 MG tablet     Sig: Take 1 tablet by mouth daily     Dispense:  90 tablet     Refill:  3    zoster recombinant adjuvanted vaccine (SHINGRIX) 50 MCG/0.5ML SUSR injection     Sig: Inject 0.5 mLs into the muscle See Admin Instructions 1 dose now and repeat in 2-6 months     Dispense:  0.5 mL     Refill:  0      Will cont with current treatment as pt is currently stable on current treatment    Will cont with the Cozaar as prescribed for BP control    Continue to watch carbs secondary to increased Triglycerides and BS    Stay well hydrated and NSAID's in moderation - kidney function is stable and improved    Rest of systems unchanged, continue current treatments. Medications, labs, diagnostic studies, consultations and follow-up as documented in this encounter.  Rest of systems unchanged, continue current treatments    On this date May 9, 2022,  I have spent greater than 50% of visit reviewing previous notes, test results and face to face with the patient discussing the diagnoses, importance of compliance with the treatment plan, counseling, coordinating care as well as documenting on the day of the visit. Rhea Lopez MD

## 2022-11-02 ENCOUNTER — HOSPITAL ENCOUNTER (OUTPATIENT)
Age: 77
Setting detail: SPECIMEN
Discharge: HOME | End: 2022-11-02

## 2022-11-02 DIAGNOSIS — Z13.220 SCREENING CHOLESTEROL LEVEL: ICD-10-CM

## 2022-11-02 DIAGNOSIS — R73.9 HYPERGLYCEMIA: ICD-10-CM

## 2022-11-02 DIAGNOSIS — I10 ESSENTIAL HYPERTENSION: ICD-10-CM

## 2022-11-02 DIAGNOSIS — N18.31 STAGE 3A CHRONIC KIDNEY DISEASE (HCC): ICD-10-CM

## 2022-11-02 LAB
ALBUMIN SERPL-MCNC: 3.8 G/DL (ref 3.5–5.2)
ALBUMIN/GLOBULIN RATIO: 1 (ref 1–2.5)
ALP BLD-CCNC: 54 U/L (ref 35–104)
ALT SERPL-CCNC: 12 U/L (ref 5–33)
ANION GAP SERPL CALCULATED.3IONS-SCNC: 14 MMOL/L (ref 9–17)
AST SERPL-CCNC: 15 U/L
BILIRUB SERPL-MCNC: 0.5 MG/DL (ref 0.3–1.2)
BUN BLDV-MCNC: 17 MG/DL (ref 8–23)
CALCIUM SERPL-MCNC: 9.3 MG/DL (ref 8.6–10.4)
CHLORIDE BLD-SCNC: 102 MMOL/L (ref 98–107)
CHOLESTEROL/HDL RATIO: 2.8
CHOLESTEROL: 128 MG/DL
CO2: 24 MMOL/L (ref 20–31)
CREAT SERPL-MCNC: 1.15 MG/DL (ref 0.5–0.9)
GFR SERPL CREATININE-BSD FRML MDRD: 50 ML/MIN/1.73M2
GLUCOSE BLD-MCNC: 83 MG/DL (ref 70–99)
HCT VFR BLD CALC: 42.9 % (ref 36.3–47.1)
HDLC SERPL-MCNC: 45 MG/DL
HEMOGLOBIN: 12.7 G/DL (ref 11.9–15.1)
LDL CHOLESTEROL: 63 MG/DL (ref 0–130)
MCH RBC QN AUTO: 27.4 PG (ref 25.2–33.5)
MCHC RBC AUTO-ENTMCNC: 29.6 G/DL (ref 28.4–34.8)
MCV RBC AUTO: 92.7 FL (ref 82.6–102.9)
NRBC AUTOMATED: 0 PER 100 WBC
PDW BLD-RTO: 14.7 % (ref 11.8–14.4)
PLATELET # BLD: 286 K/UL (ref 138–453)
PMV BLD AUTO: 12.7 FL (ref 8.1–13.5)
POTASSIUM SERPL-SCNC: 4.8 MMOL/L (ref 3.7–5.3)
RBC # BLD: 4.63 M/UL (ref 3.95–5.11)
SODIUM BLD-SCNC: 140 MMOL/L (ref 135–144)
TOTAL PROTEIN: 7.5 G/DL (ref 6.4–8.3)
TRIGL SERPL-MCNC: 99 MG/DL
WBC # BLD: 9.9 K/UL (ref 3.5–11.3)

## 2022-11-03 LAB
ESTIMATED AVERAGE GLUCOSE: 117 MG/DL
HBA1C MFR BLD: 5.7 % (ref 4–6)

## 2022-11-07 ENCOUNTER — OFFICE VISIT (OUTPATIENT)
Dept: FAMILY MEDICINE CLINIC | Age: 77
End: 2022-11-07
Payer: MEDICARE

## 2022-11-07 VITALS
WEIGHT: 235 LBS | SYSTOLIC BLOOD PRESSURE: 124 MMHG | OXYGEN SATURATION: 98 % | TEMPERATURE: 97.1 F | DIASTOLIC BLOOD PRESSURE: 86 MMHG | HEIGHT: 67 IN | RESPIRATION RATE: 16 BRPM | HEART RATE: 70 BPM | BODY MASS INDEX: 36.88 KG/M2

## 2022-11-07 DIAGNOSIS — Z12.31 ENCOUNTER FOR SCREENING MAMMOGRAM FOR HIGH-RISK PATIENT: ICD-10-CM

## 2022-11-07 DIAGNOSIS — I10 ESSENTIAL HYPERTENSION: ICD-10-CM

## 2022-11-07 DIAGNOSIS — Z00.00 MEDICARE ANNUAL WELLNESS VISIT, SUBSEQUENT: Primary | ICD-10-CM

## 2022-11-07 DIAGNOSIS — K21.9 GASTROESOPHAGEAL REFLUX DISEASE WITHOUT ESOPHAGITIS: ICD-10-CM

## 2022-11-07 DIAGNOSIS — M19.90 OSTEOARTHRITIS, UNSPECIFIED OSTEOARTHRITIS TYPE, UNSPECIFIED SITE: ICD-10-CM

## 2022-11-07 DIAGNOSIS — R73.9 HYPERGLYCEMIA: ICD-10-CM

## 2022-11-07 DIAGNOSIS — N18.31 STAGE 3A CHRONIC KIDNEY DISEASE (HCC): ICD-10-CM

## 2022-11-07 DIAGNOSIS — Z23 NEED FOR INFLUENZA VACCINATION: ICD-10-CM

## 2022-11-07 DIAGNOSIS — Z85.42 HISTORY OF ENDOMETRIAL CANCER: ICD-10-CM

## 2022-11-07 PROCEDURE — 1090F PRES/ABSN URINE INCON ASSESS: CPT | Performed by: FAMILY MEDICINE

## 2022-11-07 PROCEDURE — G8484 FLU IMMUNIZE NO ADMIN: HCPCS | Performed by: FAMILY MEDICINE

## 2022-11-07 PROCEDURE — 1123F ACP DISCUSS/DSCN MKR DOCD: CPT | Performed by: FAMILY MEDICINE

## 2022-11-07 PROCEDURE — 90694 VACC AIIV4 NO PRSRV 0.5ML IM: CPT | Performed by: FAMILY MEDICINE

## 2022-11-07 PROCEDURE — G8427 DOCREV CUR MEDS BY ELIG CLIN: HCPCS | Performed by: FAMILY MEDICINE

## 2022-11-07 PROCEDURE — 3017F COLORECTAL CA SCREEN DOC REV: CPT | Performed by: FAMILY MEDICINE

## 2022-11-07 PROCEDURE — G0008 ADMIN INFLUENZA VIRUS VAC: HCPCS | Performed by: FAMILY MEDICINE

## 2022-11-07 PROCEDURE — G8417 CALC BMI ABV UP PARAM F/U: HCPCS | Performed by: FAMILY MEDICINE

## 2022-11-07 PROCEDURE — 99214 OFFICE O/P EST MOD 30 MIN: CPT | Performed by: FAMILY MEDICINE

## 2022-11-07 PROCEDURE — 3074F SYST BP LT 130 MM HG: CPT | Performed by: FAMILY MEDICINE

## 2022-11-07 PROCEDURE — 1036F TOBACCO NON-USER: CPT | Performed by: FAMILY MEDICINE

## 2022-11-07 PROCEDURE — G0439 PPPS, SUBSEQ VISIT: HCPCS | Performed by: FAMILY MEDICINE

## 2022-11-07 PROCEDURE — G8399 PT W/DXA RESULTS DOCUMENT: HCPCS | Performed by: FAMILY MEDICINE

## 2022-11-07 PROCEDURE — 3078F DIAST BP <80 MM HG: CPT | Performed by: FAMILY MEDICINE

## 2022-11-07 ASSESSMENT — ENCOUNTER SYMPTOMS
CHEST TIGHTNESS: 0
DIARRHEA: 0
RHINORRHEA: 0
ABDOMINAL PAIN: 0
BACK PAIN: 0
COUGH: 0
VOMITING: 0
NAUSEA: 0
SORE THROAT: 0
SHORTNESS OF BREATH: 0
CONSTIPATION: 0
ABDOMINAL DISTENTION: 0

## 2022-11-07 ASSESSMENT — PATIENT HEALTH QUESTIONNAIRE - PHQ9
1. LITTLE INTEREST OR PLEASURE IN DOING THINGS: 1
SUM OF ALL RESPONSES TO PHQ QUESTIONS 1-9: 1
SUM OF ALL RESPONSES TO PHQ9 QUESTIONS 1 & 2: 1
2. FEELING DOWN, DEPRESSED OR HOPELESS: 0
SUM OF ALL RESPONSES TO PHQ QUESTIONS 1-9: 1

## 2022-11-07 ASSESSMENT — LIFESTYLE VARIABLES: HOW OFTEN DO YOU HAVE A DRINK CONTAINING ALCOHOL: NEVER

## 2022-11-07 NOTE — PROGRESS NOTES
Vaccine Information Sheet, \"Influenza - Inactivated\"  given to Versa Certain, or parent/legal guardian of  Versa Certain and verbalized understanding. Patient responses:    Have you ever had a reaction to a flu vaccine? No  Are you able to eat eggs without adverse effects? Yes  Do you have any current illness? No  Have you ever had [place] Syndrome? No    Flu vaccine given per order. Please see immunization tab.

## 2022-11-07 NOTE — PROGRESS NOTES
Rhea Byrne MD    [address] [place] [address] [facility]  [id number]  [address] [address]  Dept: [phone]  Dept Fax: [phone]     Patient ID: Tianna Alexis is a 78 y.o. female. HPI    Established patient here today for f/u on chronic medical problems, go over labs and/or diagnostic studies, and medication refills. Pt denies any fever or chills. Pt today denies any HA, chest pain, or SOB. Pt denies any N/V/D/C or abdominal pain. Pt with arthralgias on and off, but stable on meds. Pt with occasional heartburn, but stable on meds. Otherwise pt doing well on current tx and no other concerns today. The patient's past medical, surgical, social, and family history as well as his current medications and allergies were reviewed as documented in today's encounter. My previous office notes, consult notes, labs and diagnostic studies were reviewed prior to and during encounter. Current Outpatient Medications on File Prior to Visit   Medication Sig Dispense Refill    losartan (COZAAR) 100 MG tablet Take 1 tablet by mouth daily 90 tablet 3    Naproxen Sodium 220 MG CAPS Take by mouth PRN      omeprazole (PRILOSEC OTC) 20 MG tablet Take 20 mg by mouth daily       Calcium Carbonate (CALTRATE 600 PO) Take 1 tablet by mouth daily. Cholecalciferol (VITAMIN D) 2000 UNIT CAPS capsule Take 1 capsule by mouth daily. Ibuprofen (MOTRIN IB PO) Take 200 mg by mouth as needed. No current facility-administered medications on file prior to visit. Subjective:     Review of Systems   Constitutional:  Negative for appetite change and fever. HENT:  Negative for congestion, ear pain, rhinorrhea and sore throat. Respiratory:  Negative for cough, chest tightness and shortness of breath. Cardiovascular:  Negative for chest pain and palpitations.    Gastrointestinal:  Negative for abdominal distention, abdominal pain, constipation, diarrhea, nausea and vomiting. Genitourinary:  Negative for difficulty urinating and dysuria. Musculoskeletal:  Positive for arthralgias. Negative for back pain and myalgias. Skin:  Negative for rash. Neurological:  Negative for dizziness, weakness, light-headedness and headaches. Hematological:  Negative for adenopathy. Psychiatric/Behavioral:  Negative for behavioral problems and sleep disturbance. The patient is not nervous/anxious. Objective:     Physical Exam  Vitals reviewed. Constitutional:       General: She is not in acute distress. Appearance: She is well-developed. HENT:      Head: Normocephalic and atraumatic. Right Ear: External ear normal.      Left Ear: External ear normal.      Nose: Nose normal.      Mouth/Throat:      Pharynx: No oropharyngeal exudate. Eyes:      Conjunctiva/sclera: Conjunctivae normal.      Pupils: Pupils are equal, round, and reactive to light. Cardiovascular:      Rate and Rhythm: Normal rate and regular rhythm. Heart sounds: Normal heart sounds. No murmur heard. Pulmonary:      Effort: Pulmonary effort is normal. No respiratory distress. Breath sounds: Normal breath sounds. No wheezing. Chest:      Chest wall: No tenderness. Abdominal:      General: Bowel sounds are normal. There is no distension. Palpations: Abdomen is soft. There is no mass. Tenderness: There is no abdominal tenderness. Musculoskeletal:         General: No tenderness. Normal range of motion. Cervical back: Normal range of motion. Lymphadenopathy:      Cervical: No cervical adenopathy. Skin:     Findings: No rash. Neurological:      Mental Status: She is alert and oriented to person, place, and time. Deep Tendon Reflexes: Reflexes are normal and symmetric. Psychiatric:         Behavior: Behavior normal.       Assessment:      Diagnosis Orders   1. Medicare annual wellness visit, subsequent        2.  Essential hypertension  CBC    Basic Metabolic Panel      3. Hyperglycemia  Hemoglobin K5A    Basic Metabolic Panel      4. Stage 3a chronic kidney disease (HCC)  Basic Metabolic Panel    stable      5. Gastroesophageal reflux disease without esophagitis        6. Osteoarthritis, unspecified osteoarthritis type, unspecified site        7. History of endometrial cancer        8. Need for influenza vaccination  Influenza, FLUAD, (age 72 y+), IM, Preservative Free, 0.5 mL      9. Encounter for screening mammogram for high-risk patient  JASWINDER DIGITAL SCREEN W OR WO CAD BILATERAL            Plan:     Orders Placed This Encounter   Procedures    JASWINDER DIGITAL SCREEN W OR WO CAD BILATERAL     Standing Status:   Future     Standing Expiration Date:   1/7/2024    Influenza, FLUAD, (age 72 y+), IM, Preservative Free, 0.5 mL    CBC     Standing Status:   Future     Standing Expiration Date:   11/7/2023    Hemoglobin A1C     Standing Status:   Future     Standing Expiration Date:   50/8/2787    Basic Metabolic Panel     Standing Status:   Future     Standing Expiration Date:   11/7/2023      Will cont with current treatment as pt is currently stable on current treatment    Will cont with the Cozaar as prescribed for BP control    Continue to watch carbs secondary to increased BS    Stay well hydrated and NSAID's in moderation - kidney function is stable     Rest of systems unchanged, continue current treatments. Medications, labs, diagnostic studies, consultations and follow-up as documented in this encounter. Rest of systems unchanged, continue current treatments    On this date November 7, 2022,  I have spent greater than 50% of visit reviewing previous notes, test results and face to face with the patient discussing the diagnoses, importance of compliance with the treatment plan, counseling, coordinating care as well as documenting on the day of the visit. Rhea Holguin MD   Medicare Annual Wellness Visit    Pierre Owusu is here for Medicare AWV and Hypertension (labs)    Assessment & Plan   Medicare annual wellness visit, subsequent  Essential hypertension  -     CBC; Future  -     Basic Metabolic Panel; Future  Hyperglycemia  -     Hemoglobin A1C; Future  -     Basic Metabolic Panel; Future  Stage 3a chronic kidney disease ([facility] [address]  Comments:  stable  Orders:  -     Basic Metabolic Panel; Future  Gastroesophageal reflux disease without esophagitis  Osteoarthritis, unspecified osteoarthritis type, unspecified site  History of endometrial cancer  Need for influenza vaccination  -     Influenza, FLUAD, (age 72 y+), IM, Preservative Free, 0.5 mL  Encounter for screening mammogram for high-risk patient  -     JASWINDER DIGITAL SCREEN W OR WO CAD BILATERAL; Future    Recommendations for Preventive Services Due: see orders and patient instructions/AVS.  Recommended screening schedule for the next 5-10 years is provided to the patient in written form: see Patient Instructions/AVS.     Return in 6 months (on 5/7/2023) for Medicare Annual Wellness Visit in 1 year, htn, hyperglycemia, CKD. Subjective   The following acute and/or chronic problems were also addressed today:  See additional note     Patient's complete Health Risk Assessment and screening values have been reviewed and are found in Flowsheets. The following problems were reviewed today and where indicated follow up appointments were made and/or referrals ordered.     Positive Risk Factor Screenings with Interventions:    Fall Risk:  Do you feel unsteady or are you worried about falling? : (!) yes  2 or more falls in past year?: no  Fall with injury in past year?: no   Fall Risk Interventions:    Home safety tips provided            General Health and ACP:  General  In general, how would you say your health is?: Good  In the past 7 days, have you experienced any of the following: New or Increased Pain, New or Increased Fatigue, Loneliness, Social Isolation, Stress or Anger?: (!) Yes  Do you have a Living Will?: Yes (Patient is unsure, but will let us know if she needs a copy.)    Advance Directives       Power of [address] Will ACP-Advance Directive ACP-Power of     Not on File Not on File Not on File Not on File        General Health Risk Interventions:  Stress: regular exercise recommended- 3-5 times per week, 30-45 minutes per session, relaxation techniques discussed    Health Habits/Nutrition:  Physical Activity: Insufficiently Active    Days of Exercise per Week: 1 day    Minutes of Exercise per Session: 10 min     Have you lost any weight without trying in the past 3 months?: No  Body mass index: (!) 36.88  Have you seen the dentist within the past year?: Yes  Health Habits/Nutrition Interventions:  Inadequate physical activity:  patient agrees to exercise for at least 150 minutes/week     Safety:  Do you have working smoke detectors?: Yes  Do you have any tripping hazards - loose or unsecured carpets or rugs?: (!) Yes (has a couple scatter rugs, explained they are tripping hazards.)  Do you have any tripping hazards - clutter in doorways, halls, or stairs?: No  Do you have either shower bars, grab bars, non-slip mats or non-slip surfaces in your shower or bathtub?: Yes  Do all of your stairways have a railing or banister?: Yes  Do you always fasten your seatbelt when you are in a car?: Yes  Safety Interventions:  Home safety tips provided           Objective   Vitals:    11/07/22 0945   BP: 124/86   Pulse: 70   Resp: 16   Temp: 97.1 °F (36.2 °C)   SpO2: 98%   Weight: 235 lb (106.6 kg)   Height: 5' 6.93\" (1.7 m)      Body mass index is 36.88 kg/m². Allergies   Allergen Reactions    Minocin [Minocycline]      dizziness    Lisinopril Other (See Comments)     cough    Sulfa Antibiotics Rash     Entire body     Prior to Visit Medications    Medication Sig Taking?  Authorizing Provider   losartan (COZAAR) 100 MG tablet Take 1 tablet by mouth daily Yes Gillian Ornelas MD   Naproxen Sodium 220 MG CAPS Take by mouth PRN Yes Historical Provider, MD   omeprazole (PRILOSEC OTC) 20 MG tablet Take 20 mg by mouth daily  Yes Historical Provider, MD   Calcium Carbonate (CALTRATE 600 PO) Take 1 tablet by mouth daily. Yes Historical Provider, MD   Cholecalciferol (VITAMIN D) 2000 UNIT CAPS capsule Take 1 capsule by mouth daily. Yes Historical Provider, MD   Ibuprofen (MOTRIN IB PO) Take 200 mg by mouth as needed.  Yes Historical Provider, MD [place] (Including outside providers/suppliers regularly involved in providing care):   Patient Care Team:  Francesca Roberts MD as PCP - General (Family Medicine)  Francesca Roberts MD as PCP - [facility] Empaneled Provider  Nate Rodrigues as Consulting Physician [facility]     Reviewed and updated this visit:  Tobacco  Allergies  Meds  Problems  Med Hx  Surg Hx  Soc Hx  Fam Hx

## 2022-11-07 NOTE — PATIENT INSTRUCTIONS
Personalized Preventive Plan for Melania Sherman - 11/7/2022  Medicare offers a range of preventive health benefits. Some of the tests and screenings are paid in full while other may be subject to a deductible, co-insurance, and/or copay. Some of these benefits include a comprehensive review of your medical history including lifestyle, illnesses that may run in your family, and various assessments and screenings as appropriate. After reviewing your medical record and screening and assessments performed today your provider may have ordered immunizations, labs, imaging, and/or referrals for you. A list of these orders (if applicable) as well as your Preventive Care list are included within your After Visit Summary for your review. Other Preventive Recommendations:    A preventive eye exam performed by an eye specialist is recommended every 1-2 years to screen for glaucoma; cataracts, macular degeneration, and other eye disorders. A preventive dental visit is recommended every 6 months. Try to get at least 150 minutes of exercise per week or 10,000 steps per day on a pedometer . Order or download the FREE \"Exercise & Physical Activity: Your Everyday Guide\" from [employer]. Call 1-535.320.5709 or search [employer] online. You need 1223-4863 mg of calcium and 0355-0587 IU of vitamin D per day. It is possible to meet your calcium requirement with diet alone, but a vitamin D supplement is usually necessary to meet this goal.  When exposed to the sun, use a sunscreen that protects against both UVA and UVB radiation with an SPF of 30 or greater. Reapply every 2 to 3 hours or after sweating, drying off with a towel, or swimming. Always wear a seat belt when traveling in a car. Always wear a helmet when riding a bicycle or motorcycle. Heart-Healthy Diet   Sodium, Fat, and Cholesterol Controlled Diet       What Is a Heart Healthy Diet?    A heart-healthy diet is one that limits sodium , certain types of fat , and cholesterol . This type of diet is recommended for:   People with any form of cardiovascular disease (eg, coronary heart disease , peripheral vascular disease , previous heart attack , previous stroke )   People with risk factors for cardiovascular disease, such as high blood pressure , high cholesterol , or diabetes   Anyone who wants to lower their risk of developing cardiovascular disease   Sodium    Sodium is a mineral found in many foods. In general, most people consume much more sodium than they need. Diets high in sodium can increase blood pressure and lead to edema (water retention). On a heart-healthy diet, you should consume no more than 2,300 mg (milligrams) of sodium per dayabout the amount in one teaspoon of table salt. The foods highest in sodium include table salt (about 50% sodium), processed foods, convenience foods, and preserved foods. Cholesterol    Cholesterol is a fat-like, waxy substance in your blood. Our bodies make some cholesterol. It is also found in animal products, with the highest amounts in fatty meat, egg yolks, whole milk, cheese, shellfish, and organ meats. On a heart-healthy diet, you should limit your cholesterol intake to less than 200 mg per day. It is normal and important to have some cholesterol in your bloodstream. But too much cholesterol can cause plaque to build up within your arteries, which can eventually lead to a heart attack or stroke. The two types of cholesterol that are most commonly referred to are:   Low-density lipoprotein (LDL) cholesterol  Also known as bad cholesterol, this is the cholesterol that tends to build up along your arteries. Bad cholesterol levels are increased by eating fats that are saturated or hydrogenated. Optimal level of this cholesterol is less than 100. Over 130 starts to get risky for heart disease.    High-density lipoprotein (HDL) cholesterol  Also known as good cholesterol, this type of cholesterol actually carries cholesterol away from your arteries and may, therefore, help lower your risk of having a heart attack. You want this level to be high (ideally greater than 60). It is a risk to have a level less than 40. You can raise this good cholesterol by eating olive oil, canola oil, avocados, or nuts. Exercise raises this level, too. Fat    Fat is calorie dense and packs a lot of calories into a small amount of food. Even though fats should be limited due to their high calorie content, not all fats are bad. In fact, some fats are quite healthful. Fat can be broken down into four main types. The good-for-you fats are:   Monounsaturated fat  found in oils such as olive and canola, avocados, and nuts and natural nut butters; can decrease cholesterol levels, while keeping levels of HDL cholesterol high   Polyunsaturated fat  found in oils such as safflower, sunflower, soybean, corn, and sesame; can decrease total cholesterol and LDL cholesterol   Omega-3 fatty acids  particularly those found in fatty fish (such as salmon, trout, tuna, mackerel, herring, and sardines); can decrease risk of arrhythmias, decrease triglyceride levels, and slightly lower blood pressure   The fats that you want to limit are:   Saturated fat  found in animal products, many fast foods, and a few vegetables; increases total blood cholesterol, including LDL levels   Animal fats that are saturated include: butter, lard, whole-milk dairy products, meat fat, and poultry skin   Vegetable fats that are saturated include: hydrogenated shortening, palm oil, coconut oil, cocoa butter   Hydrogenated or trans fat  found in margarine and vegetable shortening, most shelf stable snack foods, and fried foods; increases LDL and decreases HDL     It is generally recommended that you limit your total fat for the day to less than 30% of your total calories.  If you follow an 1800-calorie heart healthy diet, for example, this would mean 60 grams of fat or less per day. Saturated fat and trans fat in your diet raises your blood cholesterol the most, much more than dietary cholesterol does. For this reason, on a heart-healthy diet, less than 7% of your calories should come from saturated fat and ideally 0% from trans fat. On an 1800-calorie diet, this translates into less than 14 grams of saturated fat per day, leaving 46 grams of fat to come from mono- and polyunsaturated fats.    Food Choices on a Heart Healthy Diet   Food Category   Foods Recommended   Foods to Avoid   Grains   Breads and rolls without salted tops Most dry and cooked cereals Unsalted crackers and breadsticks Low-sodium or homemade breadcrumbs or stuffing All rice and pastas   Breads, rolls, and crackers with salted tops High-fat baked goods (eg, muffins, donuts, pastries) Quick breads, self-rising flour, and biscuit mixes Regular bread crumbs Instant hot cereals Commercially prepared rice, pasta, or stuffing mixes   Vegetables   Most fresh, frozen, and low-sodium canned vegetables Low-sodium and salt-free vegetable juices Canned vegetables if unsalted or rinsed   Regular canned vegetables and juices, including sauerkraut and pickled vegetables Frozen vegetables with sauces Commercially prepared potato and vegetable mixes   Fruits   Most fresh, frozen, and canned fruits All fruit juices   Fruits processed with salt or sodium   Milk   Nonfat or low-fat (1%) milk Nonfat or low-fat yogurt Cottage cheese, low-fat ricotta, cheeses labeled as low-fat and low-sodium   Whole milk Reduced-fat (2%) milk Malted and chocolate milk Full fat yogurt Most cheeses (unless low-fat and low salt) Buttermilk (no more than 1 cup per week)   Meats and Beans   Lean cuts of fresh or frozen beef, veal, lamb, or pork (look for the word loin) Fresh or frozen poultry without the skin Fresh or frozen fish and some shellfish Egg whites and egg substitutes (Limit whole eggs to three per week) Tofu Nuts or seeds (unsalted, dry-roasted), low-sodium peanut butter Dried peas, beans, and lentils   Any smoked, cured, salted, or canned meat, fish, or poultry (including way, chipped beef, cold cuts, hot dogs, sausages, sardines, and anchovies) Poultry skins Breaded and/or fried fish or meats Canned peas, beans, and lentils Salted nuts   Fats and Oils   Olive oil and canola oil Low-sodium, low-fat salad dressings and mayonnaise   Butter, margarine, coconut and palm oils, way fat   Snacks, Sweets, and Condiments   Low-sodium or unsalted versions of broths, soups, soy sauce, and condiments Pepper, herbs, and spices; vinegar, lemon, or lime juice Low-fat frozen desserts (yogurt, sherbet, fruit bars) Sugar, cocoa powder, honey, syrup, jam, and preserves Low-fat, trans-fat free cookies, cakes, and pies Darion and animal crackers, fig bars, mohsen snaps   High-fat desserts Broth, soups, gravies, and sauces, made from instant mixes or other high-sodium ingredients Salted snack foods Canned olives Meat tenderizers, seasoning salt, and most flavored vinegars   Beverages   Low-sodium carbonated beverages Tea and coffee in moderation Soy milk   Commercially softened water   Suggestions   Make whole grains, fruits, and vegetables the base of your diet. Choose heart-healthy fats such as canola, olive, and flaxseed oil, and foods high in heart-healthy fats, such as nuts, seeds, soybeans, tofu, and fish. Eat fish at least twice per week; the fish highest in omega-3 fatty acids and lowest in mercury include salmon, herring, mackerel, sardines, and canned chunk light tuna. If you eat fish less than twice per week or have high triglycerides, talk to your doctor about taking fish oil supplements. Read food labels. For products low in fat and cholesterol, look for fat free, low-fat, cholesterol free, saturated fat free, and trans fat freeAlso scan the Nutrition Facts Label, which lists saturated fat, trans fat, and cholesterol amounts. For products low in sodium, look for sodium free, very low sodium, low sodium, no added salt, and unsalted   Skip the salt when cooking or at the table; if food needs more flavor, get creative and try out different herbs and spices. Garlic and onion also add substantial flavor to foods. Trim any visible fat off meat and poultry before cooking, and drain the fat off after taylor. Use cooking methods that require little or no added fat, such as grilling, boiling, baking, poaching, broiling, roasting, steaming, stir-frying, and sauting. Avoid fast food and convenience food. They tend to be high in saturated and trans fat and have a lot of added salt. Talk to a registered dietitian for individualized diet advice. Last Reviewed: March 2011 Yakelin Haas MS, MPH, RD   Updated: 3/29/2011       Preventing Osteoporosis: After Your Visit  Your Care Instructions  Osteoporosis means the bones are weak and thin enough that they can break easily. The older you are, the more likely you are to get osteoporosis. But with plenty of calcium, vitamin D, and exercise, you can help prevent osteoporosis. The preteen and teen years are a key time for bone building. With the help of calcium, vitamin D, and exercise in those early years and beyond, the bones reach their peak density and strength by age 27. After age 27, your bones naturally start to thin and weaken. The stronger your bones are at around age 27, the lower your risk for osteoporosis. But no matter what your age and risk are, your bones still need calcium, vitamin D, and exercise to stay strong. Also avoid smoking, and limit alcohol. Smoking and heavy alcohol use can make your bones thinner. Talk to your doctor about any special risks you might have, such as having a close relative with osteoporosis or taking a medicine that can weaken bones. Your doctor can tell you the best ways to protect your bones from thinning.   Follow-up care is a key part of your treatment and safety. Be sure to make and go to all appointments, and call your doctor if you are having problems. It's also a good idea to know your test results and keep a list of the medicines you take. How can you care for yourself at home? Get enough calcium and vitamin D. The [place] [employer] recommends adults younger than age 46 need 1,000 mg of calcium and 600 IU of vitamin D each day. Women ages 46 to 79 need 1,200 mg of calcium and 600 IU of vitamin D each day. Men ages 46 to 79 need 1,000 mg of calcium and 600 IU of vitamin D each day. Adults 71 and older need 1,200 mg of calcium and 800 IU of vitamin D each day. Eat foods rich in calcium, like yogurt, cheese, milk, and dark green vegetables. Eat foods rich in vitamin D, like eggs, fatty fish, cereal, and fortified milk. Get some sunshine. Your body uses sunshine to make its own vitamin D. The safest time to be out in the sun is before 10 a.m. or after 3 p.m. Avoid getting sunburned. Sunburn can increase your risk of skin cancer. Talk to your doctor about taking a calcium plus vitamin D supplement. Ask about what type of calcium is right for you, and how much to take at a time. Adults ages 23 to 48 should not get more than 2,500 mg of calcium and 4,000 IU of vitamin D each day, whether it is from supplements and/or food. Adults ages 46 and older should not get more than 2,000 mg of calcium and 4,000 IU of vitamin D each day from supplements and/or food. Get regular bone-building exercise. Weight-bearing and resistance exercises keep bones healthy by working the muscles and bones against gravity. Start out at an exercise level that feels right for you. Add a little at a time until you can do the following:  Do 30 minutes of weight-bearing exercise on most days of the week. Walking, jogging, stair climbing, and dancing are good choices. Do resistance exercises with weights or elastic bands 2 to 3 days a week. Limit alcohol.  Drink no more than 1 alcohol drink a day if you are a woman. Drink no more than 2 alcohol drinks a day if you are a man. Do not smoke. Smoking can make bones thin faster. If you need help quitting, talk to your doctor about stop-smoking programs and medicines. These can increase your chances of quitting for good. When should you call for help? Watch closely for changes in your health, and be sure to contact your doctor if:  You need help with a healthy eating plan. You need help with an exercise plan    © [id number] [employer]. Care instructions adapted under license by [facility]. This care instruction is for use with your licensed healthcare professional. If you have questions about a medical condition or this instruction, always ask your healthcare professional. [address] any warranty or liability for your use of this information. Content Version: 9.4.14695; Last Revised: June 20, 2011                High-Fiber Diet     What Is Fiber? Dietary fiber is a form of carbohydrate found in plants that cannot be digested by humans. All plants contain fiber, including fruits, vegetables, grains, and legumes. Fiber is often classified into two categories: soluble and insoluble. Soluble fiber draws water into the bowel and can help slow digestion. Examples of foods that are high in soluble fiber include oatmeal, oat bran, barley, legumes (eg, beans and peas), apples, and strawberries. Insoluble fiber speeds digestion and can add bulk to the stool. Examples of foods that are high in insoluble fiber include whole-wheat products, wheat bran, cauliflower, green beans, and potatoes. Why Follow a High-Fiber Diet? A high-fiber diet is often recommended to prevent and treat constipation , hemorrhoids , diverticulitis , and irritable bowel syndrome .  Eating a high-fiber diet can also help improve your cholesterol levels, lower your risk of coronary heart disease , reduce your risk of type 2 diabetes , and lower your weight. For people with type 1 or 2 diabetes, a high-fiber diet can also help stabilize blood sugar levels. How Much Fiber Should I Eat? A high-fiber diet should contain  20-35 grams  of fiber a day. This is actually the amount recommended for the general adult population; however, most Americans eat only 15 grams of fiber per day. Digestion of Fiber   Eating a higher fiber diet than usual can take some getting used to by your body's digestive system. To avoid the side effects of sudden increases in dietary fiber (eg, gas, cramping, bloating, and diarrhea), increase fiber gradually and be sure to drink plenty of fluids every day. Tips for Increasing Fiber Intake   Whenever possible, choose whole grains over refined grains (eg, brown rice instead of white rice, whole-wheat bread instead of white bread). Include a variety of grains in your diet, such as wheat, rye, barley, oats, quinoa, and bulgur. Eat more vegetarian-based meals. Here are some ideas: black bean burgers, eggplant lasagna, and veggie tofu stir-paz. Choose high-fiber snacks, such as fruits, popcorn, whole-grain crackers, and nuts. Make whole-grain cereal or whole-grain toast part of your daily breakfast regime. When eating out, whether ordering a sandwich or dinner, ask for extra vegetables. When baking, replace part of the white flour with whole-wheat flour. Whole-wheat flour is particularly easy to incorporate into a recipe. High-Fiber Diet Eating Guide   Food Category   Foods Recommended   Notes   Grains   Whole-grain breads, muffins, bagels, or jacinda bread Rye bread Whole-wheat crackers or crisp breads Whole-grain or bran cereals Oatmeal, oat bran, or grits Wheat germ Whole-wheat pasta and brown rice   Read the ingredients list on food labels. Look for products that list \"whole\" as the first ingredient (eg, whole-wheat, whole oats).  Choose cereals with at least 2 grams of fiber per serving. Vegetables   All vegetables, especially asparagus, bean sprouts, broccoli, [place] sprouts, cabbage, carrots, cauliflower, celery, corn, greens, green beans, green pepper, onions, peas, potatoes (with skin), snow peas, spinach, squash, sweet potatoes, tomatoes, zucchini   For maximum fiber intake, eat the peels of fruits and vegetablesjust be sure to wash them well first.   Fruits   All fruits, especially apples, berries, grapefruits, mangoes, nectarines, oranges, peaches, pears, dried fruits (figs, dates, prunes, raisins)   Choose raw fruits and vegetables over juice, cooked, or cannedraw fruit has more fiber. Dried fruit is also a good source of fiber. Milk   With the exception of yogurt containing inulin (a type of fiber), dairy foods provide little fiber. Add more fiber by topping your yogurt or cottage cheese with fresh fruit, whole grain or bran cereals, nuts, or seeds. Meats and Beans   All beans and peas, especially Garbanzo beans, kidney beans, lentils, lima beans, split peas, and sal beans All nuts and seeds, especially almonds, peanuts, [place] nuts, cashews, peanut butter, walnuts, sesame and sunflower seeds All meat, poultry, fish, and eggs   Increase fiber in meat dishes by adding sal beans, kidney beans, black-eyed peas, bran, or oatmeal. If you are following a low-fat diet, use nuts and seeds only in moderation. Fats and Oils   All in moderation   Fats and oils do not provide fiber   Snacks, Sweets, and Condiments   Fruit Nuts Popcorn, whole-wheat pretzels, or trail mix made with dried fruits, nuts, and seeds Cakes, breads, and cookies made with oatmeal or whole-wheat flour   Most snack foods do not provide much fiber. Choose snacks with at least 2 grams of fiber per serving.      Last Reviewed: March 2011 Ashley Tanner MS, MPH, RD   Updated: 3/29/2011     Keep Your Memory Fadi Perez       Many factors can affect your ability to remembera hectic lifestyle, aging, stress, chronic disease, and certain medicines. But, there are steps you can take to sharpen your mind and help preserve your memory. Challenge Your Brain   Regularly challenging your mind may help keeps it in top shape. Good mental exercises include:   Crossword puzzlesUse a dictionary if you need it; you will learn more that way. Brainteasers Try some! Crafts, such as wood working and sewing   Hobbies, such as gardening and building model airplanes   SocializingVisit old friends or join groups to meet new ones. Reading   Learning a new language   Taking a class, whether it be art history or martínez chi   TravelingExperience the food, history, and culture of your destination   Learning to use a computer   Going to museums, the theater, or thought-provoking movies   Changing things in your daily life, such as reversing your pattern in the grocery store or brushing your teeth using your nondominant hand   Use Memory Aids   There is no need to remember every detail on your own. These memory aids can help:   Calendars and day planners   Electronic organizers to store all sorts of helpful informationThese devices can \"beep\" to remind you of appointments. A book of days to record birthdays, anniversaries, and other occasions that occur on the same date every year   Detailed \"to-do\" lists and strategically placed sticky notes   Quick \"study\" sessionsBefore a gathering, review who will be there so their names will be fresh in your mind. Establish routinesFor example, keep your keys, wallet, and umbrella in the same place all the time or take medicine with your 8:00 AM glass of juice   Live a Healthy Life   Many actions that will keep your body strong will do the same for your mind. For example:   Talk to Your Doctor About Herbs and Supplements    Malnutrition and vitamin deficiencies can impair your mental function. For example, vitamin B12 deficiency can cause a range of symptoms, including confusion.  But, what if your nutritional needs are being met? Can herbs and supplements still offer a benefit? Researchers have investigated a range of natural remedies, such as ginkgo , ginseng , and the supplement phosphatidylserine (PS). So far, though, the evidence is inconsistent as to whether these products can improve memory or thinking. If you are interested in taking herbs and supplements, talk to your doctor first because they may interact with other medicines that you are taking. Exercise Regularly    Among the many benefits of regular exercise are increased blood flow to the brain and decreased risk of certain diseases that can interfere with memory function. One study found that even moderate exercise has a beneficial effect. Examples of \"moderate\" exercise include:   Playing 18 holes of golf once a week, without a cart   Playing tennis twice a week   Walking one mile per day   Manage Stress    It can be tough to remember what is important when your mind is cluttered. Make time for relaxation. Choose activities that calm you down, and make it routine. Manage Chronic Conditions    Side effects of high blood pressure , diabetes, and heart disease can interfere with mental function. Many of the lifestyle steps discussed here can help manage these conditions. Strive to eat a healthy diet, exercise regularly, get stress under control, and follow your doctor's advice for your condition. Minimize Medications    Talk to your doctor about the medicines that you take. Some may be unnecessary. Also, healthy lifestyle habits may lower the need for certain drugs. Last Reviewed: April 2010 Ross Beck MD   Updated: 4/13/2010     Keeping Home a [facility]       As we get older, changes in balance, gait, strength, vision, hearing, and cognition make even the most youthful senior more prone to accidents. Falls are one of the leading health risks for older people.  This increased risk of falling is related to:   Aging process (eg, decreased muscle strength, slowed reflexes)   Higher incidence of chronic health problems (eg, arthritis, diabetes) that may limit mobility, agility or sensory awareness   Side effects of medicine (eg, dizziness, blurred vision)especially medicines like prescription pain medicines and drugs used to treat mental health conditions   Depending on the brittleness of your bones, the consequences of a fall can be serious and long lasting. Home Life   Research by the Association of Aging [facility]) shows that some home accidents among older adults can be prevented by making simple lifestyle changes and basic modifications and repairs to the home environment. Here are some lifestyle changes that experts recommend:   Have your hearing and vision checked regularly. Be sure to wear prescription glasses that are right for you. Speak to your doctor or pharmacist about the possible side effects of your medicines. A number of medicines can cause dizziness. If you have problems with sleep, talk to your doctor. Limit your intake of alcohol. If necessary, use a cane or walker to help maintain your balance. Wear supportive, rubber-soled shoes, even at home. If you live in a region that gets wintry weather, you may want to put special cleats on your shoes to prevent you from slipping on the snow and ice. Exercise regularly to help maintain muscle tone, agility, and balance. Always hold the banister when going up or down stairs. Also, use  bars when getting in or out of the bath or shower, or using the toilet. To avoid dizziness, get up slowly from a lying down position. Sit up first, dangling your legs for a minute or two before rising to a standing position. Overall Home Safety Check   According to the Consumer Product Safety Commision's \"Older Consumer Home Safety Checklist,\" it is important to check for potential hazards in each room. And remember, proper lighting is an essential factor in home safety.  If you cannot see clearly, you are more likely to fall. Important questions to ask yourself include:   Are lamp, electric, extension, and telephone cords placed out of the flow of traffic and maintained in good condition? Have frayed cords been replaced? Are all small rugs and runners slip resistant? If not, you can secure them to the floor with a special double-sided carpet tape. Are smoke detectors properly locatedone on every floor of your home and one outside of every sleeping area? Are they in good working order? Are batteries replaced at least once a year? Do you have a well-maintained carbon monoxide detector outside every sleeping are in your home? Does your furniture layout leave plenty of space to maneuver between and around chairs, tables, beds, and sofas? Are hallways, stairs and passages between rooms well lit? Can you reach a lamp without getting out of bed? Are floor surfaces well maintained? Shag rugs, high-pile carpeting, tile floors, and polished wood floors can be particularly slippery. Stairs should always have handrails and be carpeted or fitted with a non-skid tread. Is your telephone easily reachable. Is the cord safely tucked away? Room by Room   According to the Association of Aging, bathrooms and martina are the two most potentially hazardous rooms in your home. In the Kitchen    Be sure your stove is in proper working order and always make sure burners and the oven are off before you go out or go to sleep. Keep pots on the back burners, turn handles away from the front of the stove, and keep stove clean and free of grease build-up. Kitchen ventilation systems and range exhausts should be working properly. Keep flammable objects such as towels and pot holders away from the cooking area except when in use. Make sure kitchen curtains are tied back. Move cords and appliances away from the sink and hot surfaces.  If extension cords are needed, install wiring guides so they do not hang over the sink, range, or working areas. Look for coffee pots, kettles and toaster ovens with automatic shut-offs. Keep a mop handy in the kitchen so you can wipe up spills instantly. You should also have a small fire extinguisher. Arrange your kitchen with frequently used items on lower shelves to avoid the need to stand on a stepstool to reach them. Make sure countertops are well-lit to avoid injuries while cutting and preparing food. In the Bathroom    Use a non-slip mat or decals in the tub and shower, since wet, soapy tile or porcelain surfaces are extremely slippery. Make sure bathroom rugs are non-skid or tape them firmly to the floor. Bathtubs should have at least one, preferably two, grab bars, firmly attached to structural supports in the wall. (Do not use built-in soap holders or glass shower doors as grab bars.)    Tub seats fitted with non-slip material on the legs allow you to wash sitting down. For people with limited mobility, bathtub transfer benches allow you to slide safely into the tub. Raised toilet seats and toilet safety rails are helpful for those with knee or hip problems. In the [facility]    Make sure you use a nightlight and that the area around your bed is clear of potential obstacles. Be careful with electric blankets and never go to sleep with a heating pad, which can cause serious burns even if on a low setting. Use fire-resistant mattress covers and pillows, and NEVER smoke in bed. Keep a phone next to the bed that is programmed to dial 911 at the push of a button. If you have a chronic condition, you may want to sign on with an automatic call-in service. Typically the system includes a small pendant that connects directly to an emergency medical voice-response system. You should also make arrangements to stay in contact with someonefriend, neighbor, family memberon a regular schedule.    Fire Prevention   According to the [employer] Clearly stating your wishes can make it easier for your loved ones. Making plans while you are still able may also ease your mind and make your final days less stressful and more meaningful. Follow-up care is a key part of your treatment and safety. Be sure to make and go to all appointments, and call your doctor if you are having problems. It's also a good idea to know your test results and keep a list of the medicines you take. What can you do to plan for the end of life? You can bring these issues up with your doctor. You do not need to wait until your doctor starts the conversation. You might start with, \"What makes life worth living for me is. Lucyann Push .\" And then follow it with, \"I would not be willing to live with . Lucyann Push Lucyann Push \" When you complete this sentence it helps your doctor understand your wishes. Talk openly and honestly with your doctor. This is the best way to understand the decisions you will need to make as your health changes. Know that you can always change your mind. Ask your doctor about commonly used life-support measures. These include tube feedings, breathing machines, and fluids given through a vein (IV). Understanding these treatments will help you decide whether you want them. You may choose to have these life-supporting treatments for a limited time. This allows a trial period to see whether they will help you. You may also decide that you want your doctor to take only certain measures to keep you alive. It may help to think about the big picture, like what makes life worth living for you or what your values and goals are. Talk to your doctor about how long you are likely to live. Your doctor may be able to give you an idea of what usually happens with your specific illness. Think about preparing papers that state your wishes. These papers are called advance directives. If you do this early and review them often, there will not be any confusion about what you want.  You can change your instructions at any time. Which papers should you prepare? Advance directives are legal papers that tell doctors how you want to be cared for at the end of your life. You do not need a  to write these papers. Ask your doctor or your state [facility] department for information on how to write your advance directives. They may have the forms for each of these types of papers. Make sure your doctor has a copy of these on file, and give a copy to a family member or close friend. Consider a do-not-resuscitate order (DNR). This order asks that no extra treatments be done if your heart stops or you stop breathing. Extra treatments may include cardiopulmonary resuscitation (CPR), electrical shock to restart your heart, or a machine to breathe for you. If you decide to have a DNR order, ask your doctor to explain and write it. Place the order in your home where everyone can easily see it. Consider a living will. A living will explains your wishes about life support and other treatments at the end of your life if you become unable to speak for yourself. Living cope tell doctors to use or not use treatments that would keep you alive. You must have one or two witnesses or a notary present when you sign this form. A living will may be called something else in your state. Consider a medical power of . This form allows you to name a person to make decisions about your care if you are not able to. Most people ask a close friend or family member. Talk to this person about the kinds of treatments you want and those that you do not want. Make sure this person understands your wishes. A medical power of  may be called something else in your state. These legal papers are simple to change. Tell your doctor what you want to change, and ask him or her to make a note in your medical file. Give your family updated copies of the papers. Where can you learn more? Go to [email or web address] org and sign in to your [employer] account. Enter P184 in the [facility] box to learn more about \"Advance Care Planning: Care Instructions. \"     If you do not have an account, please click on the \"Sign Up Now\" link. Current as of: October 6, 2021               Content Version: 13.4  © 6326-6858 Healthwise, Incorporated. Care instructions adapted under license by [facility] ([facility]). If you have questions about a medical condition or this instruction, always ask your healthcare professional. [address] any warranty or liability for your use of this information.

## 2022-11-28 ENCOUNTER — HOSPITAL ENCOUNTER (OUTPATIENT)
Dept: WOMENS IMAGING | Age: 77
Discharge: HOME | End: 2022-11-30
Payer: MEDICARE

## 2022-11-28 DIAGNOSIS — Z12.31 ENCOUNTER FOR SCREENING MAMMOGRAM FOR HIGH-RISK PATIENT: ICD-10-CM

## 2022-11-28 PROCEDURE — 77067 SCR MAMMO BI INCL CAD: CPT

## 2023-04-21 DIAGNOSIS — I10 ESSENTIAL HYPERTENSION: ICD-10-CM

## 2023-04-21 RX ORDER — LOSARTAN POTASSIUM 100 MG/1
TABLET ORAL
Qty: 90 TABLET | Refills: 3 | Status: SHIPPED | OUTPATIENT
Start: 2023-04-21

## 2023-05-03 ENCOUNTER — HOSPITAL ENCOUNTER (OUTPATIENT)
Age: 78
Setting detail: SPECIMEN
Discharge: HOME | End: 2023-05-03

## 2023-05-03 DIAGNOSIS — I10 ESSENTIAL HYPERTENSION: ICD-10-CM

## 2023-05-03 DIAGNOSIS — R73.9 HYPERGLYCEMIA: ICD-10-CM

## 2023-05-03 DIAGNOSIS — N18.31 STAGE 3A CHRONIC KIDNEY DISEASE (HCC): ICD-10-CM

## 2023-05-03 LAB
HCT VFR BLD AUTO: 43.1 % (ref 36.3–47.1)
HGB BLD-MCNC: 12.9 G/DL (ref 11.9–15.1)
MCH RBC QN AUTO: 27.7 PG (ref 25.2–33.5)
MCHC RBC AUTO-ENTMCNC: 29.9 G/DL (ref 28.4–34.8)
MCV RBC AUTO: 92.7 FL (ref 82.6–102.9)
NRBC AUTOMATED: 0 PER 100 WBC
PDW BLD-RTO: 14.5 % (ref 11.8–14.4)
PLATELET # BLD AUTO: 292 K/UL (ref 138–453)
PMV BLD AUTO: 12.9 FL (ref 8.1–13.5)
RBC # BLD: 4.65 M/UL (ref 3.95–5.11)
WBC # BLD AUTO: 9.2 K/UL (ref 3.5–11.3)

## 2023-05-04 LAB
ANION GAP SERPL CALCULATED.3IONS-SCNC: 23 MMOL/L (ref 9–17)
BUN SERPL-MCNC: 16 MG/DL (ref 8–23)
CALCIUM SERPL-MCNC: 9.8 MG/DL (ref 8.6–10.4)
CHLORIDE SERPL-SCNC: 106 MMOL/L (ref 98–107)
CO2 SERPL-SCNC: 17 MMOL/L (ref 20–31)
CREAT SERPL-MCNC: 1.26 MG/DL (ref 0.5–0.9)
EST. AVERAGE GLUCOSE BLD GHB EST-MCNC: 111 MG/DL
GFR SERPL CREATININE-BSD FRML MDRD: 45 ML/MIN/1.73M2
GLUCOSE SERPL-MCNC: 89 MG/DL (ref 70–99)
HBA1C MFR BLD: 5.5 % (ref 4–6)
POTASSIUM SERPL-SCNC: 5 MMOL/L (ref 3.7–5.3)
SODIUM SERPL-SCNC: 146 MMOL/L (ref 135–144)

## 2023-05-08 ENCOUNTER — OFFICE VISIT (OUTPATIENT)
Dept: FAMILY MEDICINE CLINIC | Age: 78
End: 2023-05-08
Payer: MEDICARE

## 2023-05-08 VITALS
BODY MASS INDEX: 37.2 KG/M2 | RESPIRATION RATE: 16 BRPM | WEIGHT: 237 LBS | TEMPERATURE: 98.1 F | OXYGEN SATURATION: 96 % | HEART RATE: 74 BPM | SYSTOLIC BLOOD PRESSURE: 136 MMHG | DIASTOLIC BLOOD PRESSURE: 74 MMHG

## 2023-05-08 DIAGNOSIS — Z85.42 HISTORY OF ENDOMETRIAL CANCER: ICD-10-CM

## 2023-05-08 DIAGNOSIS — K21.9 GASTROESOPHAGEAL REFLUX DISEASE WITHOUT ESOPHAGITIS: ICD-10-CM

## 2023-05-08 DIAGNOSIS — R53.83 FATIGUE, UNSPECIFIED TYPE: ICD-10-CM

## 2023-05-08 DIAGNOSIS — Z13.220 SCREENING CHOLESTEROL LEVEL: ICD-10-CM

## 2023-05-08 DIAGNOSIS — M19.90 OSTEOARTHRITIS, UNSPECIFIED OSTEOARTHRITIS TYPE, UNSPECIFIED SITE: ICD-10-CM

## 2023-05-08 DIAGNOSIS — N18.31 STAGE 3A CHRONIC KIDNEY DISEASE (HCC): ICD-10-CM

## 2023-05-08 DIAGNOSIS — E55.9 VITAMIN D DEFICIENCY: ICD-10-CM

## 2023-05-08 DIAGNOSIS — I10 ESSENTIAL HYPERTENSION: Primary | ICD-10-CM

## 2023-05-08 DIAGNOSIS — R73.9 HYPERGLYCEMIA: ICD-10-CM

## 2023-05-08 PROCEDURE — G8399 PT W/DXA RESULTS DOCUMENT: HCPCS | Performed by: FAMILY MEDICINE

## 2023-05-08 PROCEDURE — 1123F ACP DISCUSS/DSCN MKR DOCD: CPT | Performed by: FAMILY MEDICINE

## 2023-05-08 PROCEDURE — 1036F TOBACCO NON-USER: CPT | Performed by: FAMILY MEDICINE

## 2023-05-08 PROCEDURE — 1090F PRES/ABSN URINE INCON ASSESS: CPT | Performed by: FAMILY MEDICINE

## 2023-05-08 PROCEDURE — 3075F SYST BP GE 130 - 139MM HG: CPT | Performed by: FAMILY MEDICINE

## 2023-05-08 PROCEDURE — 99214 OFFICE O/P EST MOD 30 MIN: CPT | Performed by: FAMILY MEDICINE

## 2023-05-08 PROCEDURE — 3017F COLORECTAL CA SCREEN DOC REV: CPT | Performed by: FAMILY MEDICINE

## 2023-05-08 PROCEDURE — G8417 CALC BMI ABV UP PARAM F/U: HCPCS | Performed by: FAMILY MEDICINE

## 2023-05-08 PROCEDURE — 3078F DIAST BP <80 MM HG: CPT | Performed by: FAMILY MEDICINE

## 2023-05-08 PROCEDURE — G8427 DOCREV CUR MEDS BY ELIG CLIN: HCPCS | Performed by: FAMILY MEDICINE

## 2023-05-08 SDOH — ECONOMIC STABILITY: FOOD INSECURITY: WITHIN THE PAST 12 MONTHS, THE FOOD YOU BOUGHT JUST DIDN'T LAST AND YOU DIDN'T HAVE MONEY TO GET MORE.: NEVER TRUE

## 2023-05-08 SDOH — ECONOMIC STABILITY: HOUSING INSECURITY
IN THE LAST 12 MONTHS, WAS THERE A TIME WHEN YOU DID NOT HAVE A STEADY PLACE TO SLEEP OR SLEPT IN A SHELTER (INCLUDING NOW)?: NO

## 2023-05-08 SDOH — ECONOMIC STABILITY: FOOD INSECURITY: WITHIN THE PAST 12 MONTHS, YOU WORRIED THAT YOUR FOOD WOULD RUN OUT BEFORE YOU GOT MONEY TO BUY MORE.: NEVER TRUE

## 2023-05-08 SDOH — ECONOMIC STABILITY: INCOME INSECURITY: HOW HARD IS IT FOR YOU TO PAY FOR THE VERY BASICS LIKE FOOD, HOUSING, MEDICAL CARE, AND HEATING?: NOT HARD AT ALL

## 2023-05-08 ASSESSMENT — ENCOUNTER SYMPTOMS
CONSTIPATION: 0
DIARRHEA: 0
ABDOMINAL PAIN: 0
ABDOMINAL DISTENTION: 0
BACK PAIN: 0
VOMITING: 0
SHORTNESS OF BREATH: 0
RHINORRHEA: 0
SORE THROAT: 0
NAUSEA: 0
CHEST TIGHTNESS: 0
COUGH: 0

## 2023-05-08 ASSESSMENT — PATIENT HEALTH QUESTIONNAIRE - PHQ9
SUM OF ALL RESPONSES TO PHQ QUESTIONS 1-9: 1
1. LITTLE INTEREST OR PLEASURE IN DOING THINGS: 0
2. FEELING DOWN, DEPRESSED OR HOPELESS: 1
SUM OF ALL RESPONSES TO PHQ QUESTIONS 1-9: 1
SUM OF ALL RESPONSES TO PHQ QUESTIONS 1-9: 1
SUM OF ALL RESPONSES TO PHQ9 QUESTIONS 1 & 2: 1
SUM OF ALL RESPONSES TO PHQ QUESTIONS 1-9: 1

## 2023-05-08 NOTE — PROGRESS NOTES
Behavior: Behavior normal.       Assessment:      Diagnosis Orders   1. Essential hypertension  CBC    Comprehensive Metabolic Panel      2. Hyperglycemia  Comprehensive Metabolic Panel    Hemoglobin A1C      3. Stage 3a chronic kidney disease (HCC)  Comprehensive Metabolic Panel      4. Gastroesophageal reflux disease without esophagitis        5. Osteoarthritis, unspecified osteoarthritis type, unspecified site        6. History of endometrial cancer        7. Screening cholesterol level  Lipid Panel      8. Vitamin D deficiency  Vitamin D 25 Hydroxy      9.  Fatigue, unspecified type  TSH    T4, Free    Vitamin B12    Vitamin D 25 Hydroxy            Plan:     Orders Placed This Encounter   Procedures    CBC     Standing Status:   Future     Standing Expiration Date:   5/8/2024    Comprehensive Metabolic Panel     Standing Status:   Future     Standing Expiration Date:   5/8/2024    Lipid Panel     Standing Status:   Future     Standing Expiration Date:   5/8/2024     Order Specific Question:   Is Patient Fasting?/# of Hours     Answer:   8-10 Hours    Hemoglobin A1C     Standing Status:   Future     Standing Expiration Date:   5/8/2024    TSH     Standing Status:   Future     Standing Expiration Date:   5/8/2024    T4, Free     Standing Status:   Future     Standing Expiration Date:   5/8/2024    Vitamin B12     Standing Status:   Future     Standing Expiration Date:   5/8/2024    Vitamin D 25 Hydroxy     Standing Status:   Future     Standing Expiration Date:   5/8/2024        Will cont with current treatment as pt is currently stable on current treatment    Will cont with the Cozaar as prescribed for BP control    Continue to watch carbs secondary to increased BS    Stay well hydrated and NSAID's in moderation and only use the Ibuprofen when her pain is more severe    She can take Tylenol and will have her try OTC Voltaren gel    Will cont with the Omeprazole as prescribed     Will check thyroid studies,

## 2023-08-07 ENCOUNTER — HOSPITAL ENCOUNTER (OUTPATIENT)
Age: 78
Setting detail: SPECIMEN
Discharge: HOME | End: 2023-08-07

## 2023-08-07 DIAGNOSIS — N18.31 STAGE 3A CHRONIC KIDNEY DISEASE (HCC): ICD-10-CM

## 2023-08-07 DIAGNOSIS — R73.9 HYPERGLYCEMIA: ICD-10-CM

## 2023-08-07 DIAGNOSIS — I10 ESSENTIAL HYPERTENSION: ICD-10-CM

## 2023-08-07 DIAGNOSIS — R53.83 FATIGUE, UNSPECIFIED TYPE: ICD-10-CM

## 2023-08-07 DIAGNOSIS — Z13.220 SCREENING CHOLESTEROL LEVEL: ICD-10-CM

## 2023-08-07 DIAGNOSIS — E55.9 VITAMIN D DEFICIENCY: ICD-10-CM

## 2023-08-07 LAB
25(OH)D3 SERPL-MCNC: 55.1 NG/ML
ALBUMIN SERPL-MCNC: 4.1 G/DL (ref 3.5–5.2)
ALBUMIN/GLOB SERPL: 1.1 {RATIO} (ref 1–2.5)
ALP SERPL-CCNC: 57 U/L (ref 35–104)
ALT SERPL-CCNC: 16 U/L (ref 5–33)
ANION GAP SERPL CALCULATED.3IONS-SCNC: 19 MMOL/L (ref 9–17)
AST SERPL-CCNC: 21 U/L
BILIRUB SERPL-MCNC: 0.4 MG/DL (ref 0.3–1.2)
BUN SERPL-MCNC: 13 MG/DL (ref 8–23)
CALCIUM SERPL-MCNC: 9.5 MG/DL (ref 8.6–10.4)
CHLORIDE SERPL-SCNC: 103 MMOL/L (ref 98–107)
CHOLEST SERPL-MCNC: 128 MG/DL
CHOLESTEROL/HDL RATIO: 2.8
CO2 SERPL-SCNC: 20 MMOL/L (ref 20–31)
CREAT SERPL-MCNC: 1.1 MG/DL (ref 0.5–0.9)
ERYTHROCYTE [DISTWIDTH] IN BLOOD BY AUTOMATED COUNT: 14.6 % (ref 11.8–14.4)
EST. AVERAGE GLUCOSE BLD GHB EST-MCNC: 114 MG/DL
GFR SERPL CREATININE-BSD FRML MDRD: 52 ML/MIN/1.73M2
GLUCOSE SERPL-MCNC: 101 MG/DL (ref 70–99)
HBA1C MFR BLD: 5.6 % (ref 4–6)
HCT VFR BLD AUTO: 44.3 % (ref 36.3–47.1)
HDLC SERPL-MCNC: 45 MG/DL
HGB BLD-MCNC: 13.5 G/DL (ref 11.9–15.1)
LDLC SERPL CALC-MCNC: 54 MG/DL (ref 0–130)
MCH RBC QN AUTO: 28 PG (ref 25.2–33.5)
MCHC RBC AUTO-ENTMCNC: 30.5 G/DL (ref 28.4–34.8)
MCV RBC AUTO: 91.7 FL (ref 82.6–102.9)
NRBC BLD-RTO: 0 PER 100 WBC
PLATELET # BLD AUTO: ABNORMAL K/UL (ref 138–453)
PLATELET, FLUORESCENCE: 263 K/UL (ref 138–453)
PLATELETS.RETICULATED NFR BLD AUTO: 11.6 % (ref 1.1–10.3)
POTASSIUM SERPL-SCNC: 4.4 MMOL/L (ref 3.7–5.3)
PROT SERPL-MCNC: 7.8 G/DL (ref 6.4–8.3)
RBC # BLD AUTO: 4.83 M/UL (ref 3.95–5.11)
SODIUM SERPL-SCNC: 142 MMOL/L (ref 135–144)
T4 FREE SERPL-MCNC: 1.3 NG/DL (ref 0.9–1.7)
TRIGL SERPL-MCNC: 145 MG/DL
TSH SERPL DL<=0.05 MIU/L-ACNC: 1.63 UIU/ML (ref 0.3–5)
VIT B12 SERPL-MCNC: 297 PG/ML (ref 232–1245)
WBC OTHER # BLD: 9.6 K/UL (ref 3.5–11.3)

## 2023-08-10 ENCOUNTER — OFFICE VISIT (OUTPATIENT)
Dept: FAMILY MEDICINE CLINIC | Age: 78
End: 2023-08-10
Payer: MEDICARE

## 2023-08-10 VITALS
SYSTOLIC BLOOD PRESSURE: 126 MMHG | HEART RATE: 64 BPM | OXYGEN SATURATION: 98 % | TEMPERATURE: 98.5 F | WEIGHT: 235 LBS | BODY MASS INDEX: 36.88 KG/M2 | HEIGHT: 67 IN | RESPIRATION RATE: 16 BRPM | DIASTOLIC BLOOD PRESSURE: 72 MMHG

## 2023-08-10 DIAGNOSIS — M19.90 OSTEOARTHRITIS, UNSPECIFIED OSTEOARTHRITIS TYPE, UNSPECIFIED SITE: ICD-10-CM

## 2023-08-10 DIAGNOSIS — Z85.42 HISTORY OF ENDOMETRIAL CANCER: ICD-10-CM

## 2023-08-10 DIAGNOSIS — N18.31 STAGE 3A CHRONIC KIDNEY DISEASE (HCC): ICD-10-CM

## 2023-08-10 DIAGNOSIS — I10 ESSENTIAL HYPERTENSION: Primary | ICD-10-CM

## 2023-08-10 DIAGNOSIS — K21.9 GASTROESOPHAGEAL REFLUX DISEASE WITHOUT ESOPHAGITIS: ICD-10-CM

## 2023-08-10 DIAGNOSIS — R73.9 HYPERGLYCEMIA: ICD-10-CM

## 2023-08-10 PROCEDURE — 3078F DIAST BP <80 MM HG: CPT | Performed by: FAMILY MEDICINE

## 2023-08-10 PROCEDURE — G8417 CALC BMI ABV UP PARAM F/U: HCPCS | Performed by: FAMILY MEDICINE

## 2023-08-10 PROCEDURE — 1090F PRES/ABSN URINE INCON ASSESS: CPT | Performed by: FAMILY MEDICINE

## 2023-08-10 PROCEDURE — 3074F SYST BP LT 130 MM HG: CPT | Performed by: FAMILY MEDICINE

## 2023-08-10 PROCEDURE — G8399 PT W/DXA RESULTS DOCUMENT: HCPCS | Performed by: FAMILY MEDICINE

## 2023-08-10 PROCEDURE — 99214 OFFICE O/P EST MOD 30 MIN: CPT | Performed by: FAMILY MEDICINE

## 2023-08-10 PROCEDURE — 1036F TOBACCO NON-USER: CPT | Performed by: FAMILY MEDICINE

## 2023-08-10 PROCEDURE — 3017F COLORECTAL CA SCREEN DOC REV: CPT | Performed by: FAMILY MEDICINE

## 2023-08-10 PROCEDURE — 1123F ACP DISCUSS/DSCN MKR DOCD: CPT | Performed by: FAMILY MEDICINE

## 2023-08-10 PROCEDURE — G8427 DOCREV CUR MEDS BY ELIG CLIN: HCPCS | Performed by: FAMILY MEDICINE

## 2023-08-10 ASSESSMENT — ENCOUNTER SYMPTOMS
NAUSEA: 0
ABDOMINAL PAIN: 0
RHINORRHEA: 0
DIARRHEA: 0
VOMITING: 0
ABDOMINAL DISTENTION: 0
BACK PAIN: 0
COUGH: 0
SORE THROAT: 0
CONSTIPATION: 0
SHORTNESS OF BREATH: 0
CHEST TIGHTNESS: 0

## 2023-08-10 ASSESSMENT — PATIENT HEALTH QUESTIONNAIRE - PHQ9
SUM OF ALL RESPONSES TO PHQ QUESTIONS 1-9: 1
SUM OF ALL RESPONSES TO PHQ QUESTIONS 1-9: 1
SUM OF ALL RESPONSES TO PHQ9 QUESTIONS 1 & 2: 1
SUM OF ALL RESPONSES TO PHQ QUESTIONS 1-9: 1
2. FEELING DOWN, DEPRESSED OR HOPELESS: 1
SUM OF ALL RESPONSES TO PHQ QUESTIONS 1-9: 1
1. LITTLE INTEREST OR PLEASURE IN DOING THINGS: 0

## 2023-08-10 NOTE — PROGRESS NOTES
Rhea Morrell MD    [address] [place] [address]  [id number] [address], [address] [address]  Dept: [phone]  Dept Fax: [phone]     Patient ID: Cristin Gomez is a 78 y.o. female. HPI    Established patient here today for f/u on chronic medical problems, go over labs and/or diagnostic studies, and medication refills. Pt denies any fever or chills. Pt today denies any HA, chest pain, or SOB. Pt denies any N/V/D/C or abdominal pain. Pt with arthralgias on and off, but stable on meds. Pt with occasional heartburn, but stable on meds. Pt with arthralgias on and off, but stable on meds. Otherwise pt doing well on current tx and no other concerns today. The patient's past medical, surgical, social, and family history as well as his current medications and allergies were reviewed as documented in today's encounter. My previous office notes, consult notes, labs and diagnostic studies were reviewed prior to and during encounter. Current Outpatient Medications on File Prior to Visit   Medication Sig Dispense Refill    losartan (COZAAR) 100 MG tablet Take 1 tablet by mouth once daily 90 tablet 3    Naproxen Sodium 220 MG CAPS Take by mouth PRN      omeprazole (PRILOSEC OTC) 20 MG tablet Take 1 tablet by mouth daily      Cholecalciferol (VITAMIN D) 2000 UNIT CAPS capsule Take 1 capsule by mouth daily      Ibuprofen (MOTRIN IB PO) Take 200 mg by mouth as needed. Calcium Carbonate (CALTRATE 600 PO) Take 1 tablet by mouth daily. (Patient not taking: Reported on 5/8/2023)       No current facility-administered medications on file prior to visit. Subjective:     Review of Systems   Constitutional:  Negative for appetite change and fever. HENT:  Negative for congestion, ear pain, rhinorrhea and sore throat. Respiratory:  Negative for cough, chest tightness and shortness of breath.     Cardiovascular:  Negative for chest pain and

## 2023-11-13 ENCOUNTER — OFFICE VISIT (OUTPATIENT)
Dept: FAMILY MEDICINE CLINIC | Age: 78
End: 2023-11-13

## 2023-11-13 VITALS
BODY MASS INDEX: 37 KG/M2 | OXYGEN SATURATION: 99 % | WEIGHT: 230.2 LBS | DIASTOLIC BLOOD PRESSURE: 78 MMHG | HEART RATE: 73 BPM | SYSTOLIC BLOOD PRESSURE: 130 MMHG | HEIGHT: 66 IN

## 2023-11-13 DIAGNOSIS — M19.90 OSTEOARTHRITIS, UNSPECIFIED OSTEOARTHRITIS TYPE, UNSPECIFIED SITE: ICD-10-CM

## 2023-11-13 DIAGNOSIS — K21.9 GASTROESOPHAGEAL REFLUX DISEASE WITHOUT ESOPHAGITIS: ICD-10-CM

## 2023-11-13 DIAGNOSIS — R73.9 HYPERGLYCEMIA: ICD-10-CM

## 2023-11-13 DIAGNOSIS — Z23 NEED FOR INFLUENZA VACCINATION: ICD-10-CM

## 2023-11-13 DIAGNOSIS — N18.31 STAGE 3A CHRONIC KIDNEY DISEASE (HCC): ICD-10-CM

## 2023-11-13 DIAGNOSIS — I10 ESSENTIAL HYPERTENSION: Primary | ICD-10-CM

## 2023-11-13 SDOH — ECONOMIC STABILITY: FOOD INSECURITY: WITHIN THE PAST 12 MONTHS, YOU WORRIED THAT YOUR FOOD WOULD RUN OUT BEFORE YOU GOT MONEY TO BUY MORE.: NEVER TRUE

## 2023-11-13 SDOH — ECONOMIC STABILITY: FOOD INSECURITY: WITHIN THE PAST 12 MONTHS, THE FOOD YOU BOUGHT JUST DIDN'T LAST AND YOU DIDN'T HAVE MONEY TO GET MORE.: NEVER TRUE

## 2023-11-13 SDOH — ECONOMIC STABILITY: INCOME INSECURITY: HOW HARD IS IT FOR YOU TO PAY FOR THE VERY BASICS LIKE FOOD, HOUSING, MEDICAL CARE, AND HEATING?: NOT HARD AT ALL

## 2023-11-13 ASSESSMENT — ENCOUNTER SYMPTOMS
ABDOMINAL PAIN: 0
CONSTIPATION: 0
BACK PAIN: 0
CHEST TIGHTNESS: 0
RHINORRHEA: 0
VOMITING: 0
COUGH: 0
SHORTNESS OF BREATH: 0
SORE THROAT: 0
ABDOMINAL DISTENTION: 0
DIARRHEA: 0
NAUSEA: 0

## 2023-11-13 ASSESSMENT — PATIENT HEALTH QUESTIONNAIRE - PHQ9
SUM OF ALL RESPONSES TO PHQ QUESTIONS 1-9: 2
SUM OF ALL RESPONSES TO PHQ QUESTIONS 1-9: 2
1. LITTLE INTEREST OR PLEASURE IN DOING THINGS: 1
SUM OF ALL RESPONSES TO PHQ QUESTIONS 1-9: 2
2. FEELING DOWN, DEPRESSED OR HOPELESS: 1
SUM OF ALL RESPONSES TO PHQ QUESTIONS 1-9: 2
SUM OF ALL RESPONSES TO PHQ9 QUESTIONS 1 & 2: 2

## 2023-11-13 NOTE — PROGRESS NOTES
Rhea Reich MD    [address] [place] [address]  [address] [address], [address] [address]  Dept: [phone]  Dept Fax: [phone]     Patient ID: Jaylene Johnson is a 78 y.o. female. HPI    Established patient here today for f/u on chronic medical problems, go over labs and/or diagnostic studies, and medication refills. Pt denies any fever or chills. Pt today denies any HA, chest pain, or SOB. Pt denies any N/V/D/C or abdominal pain. Pt with occasional heartburn, but stable on meds. Pt with arthralgias on and off, but stable on meds. Otherwise pt doing well on current tx and no other concerns today. The patient's past medical, surgical, social, and family history as well as his current medications and allergies were reviewed as documented in today's encounter. My previous office notes, consult notes, labs and diagnostic studies were reviewed prior to and during encounter. Current Outpatient Medications on File Prior to Visit   Medication Sig Dispense Refill    losartan (COZAAR) 100 MG tablet Take 1 tablet by mouth once daily 90 tablet 3    Naproxen Sodium 220 MG CAPS Take by mouth PRN      omeprazole (PRILOSEC OTC) 20 MG tablet Take 1 tablet by mouth daily      Calcium Carbonate (CALTRATE 600 PO) Take 1 tablet by mouth daily      Cholecalciferol (VITAMIN D) 2000 UNIT CAPS capsule Take 1 capsule by mouth daily      Ibuprofen (MOTRIN IB PO) Take 200 mg by mouth as needed. No current facility-administered medications on file prior to visit. Subjective:     Review of Systems   Constitutional:  Negative for appetite change and fever. HENT:  Negative for congestion, ear pain, rhinorrhea and sore throat. Respiratory:  Negative for cough, chest tightness and shortness of breath. Cardiovascular:  Negative for chest pain and palpitations.    Gastrointestinal:  Negative for abdominal distention, abdominal pain, constipation, diarrhea,

## 2023-11-14 ENCOUNTER — TELEMEDICINE (OUTPATIENT)
Dept: FAMILY MEDICINE CLINIC | Age: 78
End: 2023-11-14
Payer: MEDICARE

## 2023-11-14 DIAGNOSIS — Z00.00 MEDICARE ANNUAL WELLNESS VISIT, SUBSEQUENT: Primary | ICD-10-CM

## 2023-11-14 PROCEDURE — 1123F ACP DISCUSS/DSCN MKR DOCD: CPT | Performed by: FAMILY MEDICINE

## 2023-11-14 PROCEDURE — 3017F COLORECTAL CA SCREEN DOC REV: CPT | Performed by: FAMILY MEDICINE

## 2023-11-14 PROCEDURE — G0439 PPPS, SUBSEQ VISIT: HCPCS | Performed by: FAMILY MEDICINE

## 2023-11-14 ASSESSMENT — PATIENT HEALTH QUESTIONNAIRE - PHQ9
SUM OF ALL RESPONSES TO PHQ QUESTIONS 1-9: 0
2. FEELING DOWN, DEPRESSED OR HOPELESS: 0
SUM OF ALL RESPONSES TO PHQ QUESTIONS 1-9: 0
SUM OF ALL RESPONSES TO PHQ9 QUESTIONS 1 & 2: 0
SUM OF ALL RESPONSES TO PHQ QUESTIONS 1-9: 0
SUM OF ALL RESPONSES TO PHQ QUESTIONS 1-9: 0
1. LITTLE INTEREST OR PLEASURE IN DOING THINGS: 0

## 2023-11-14 ASSESSMENT — LIFESTYLE VARIABLES
HOW MANY STANDARD DRINKS CONTAINING ALCOHOL DO YOU HAVE ON A TYPICAL DAY: 1 OR 2
HOW OFTEN DO YOU HAVE A DRINK CONTAINING ALCOHOL: MONTHLY OR LESS

## 2023-11-14 NOTE — PROGRESS NOTES
Medicare Annual Wellness Visit    Mirian Patient is here for Medicare AW    Assessment & Plan     Recommendations for Preventive Services Due: see orders and patient instructions/AVS.  Recommended screening schedule for the next 5-10 years is provided to the patient in written form: see Patient Instructions/AVS.     No follow-ups on file. Subjective     Patient's complete Health Risk Assessment and screening values have been reviewed and are found in Flowsheets. The following problems were reviewed today and where indicated follow up appointments were made and/or referrals ordered. Positive Risk Factor Screenings with Interventions:                 Weight and Activity:  Physical Activity: Insufficiently Active (11/14/2023)    Exercise Vital Sign     Days of Exercise per Week: 1 day     Minutes of Exercise per Session: 20 min     On average, how many days per week do you engage in moderate to strenuous exercise (like a brisk walk)?: 1 day  Have you lost any weight without trying in the past 3 months?: No  There is no height or weight on file to calculate BMI. (!) Abnormal    Obesity Interventions:  low carbohydrate diet, exercise for at least 150 minutes/week               Advanced Directives:  Do you have a Living Will?: (!) No    Intervention:  Information on Living [facility] given to pt                        Objective      Patient-Reported Vitals  No data recorded          Allergies   Allergen Reactions    Minocin [Minocycline]      dizziness    Lisinopril Other (See Comments)     cough    Sulfa Antibiotics Rash     Entire body     Prior to Visit Medications    Medication Sig Taking?  Authorizing Provider   losartan (COZAAR) 100 MG tablet Take 1 tablet by mouth once daily Yes Josr Gonzalez MD   Naproxen Sodium 220 MG CAPS Take by mouth PRN Yes ProviderSoniya MD   omeprazole (PRILOSEC OTC) 20 MG tablet Take 1 tablet by mouth daily Yes Soniya Zhang MD   Calcium Carbonate (CALTRATE 600

## 2023-11-14 NOTE — PATIENT INSTRUCTIONS
Advance Directives: Care Instructions  Overview  An advance directive is a legal way to state your wishes at the end of your life. It tells your family and your doctor what to do if you can't say what you want. There are two main types of advance directives. You can change them any time your wishes change. Living will. This form tells your family and your doctor your wishes about life support and other treatment. The form is also called a declaration. Medical power of . This form lets you name a person to make treatment decisions for you when you can't speak for yourself. This person is called a health care agent (health care proxy, health care surrogate). The form is also called a durable power of  for health care. If you do not have an advance directive, decisions about your medical care may be made by a family member, or by a doctor or a  who doesn't know you. It may help to think of an advance directive as a gift to the people who care for you. If you have one, they won't have to make tough decisions by themselves. For more information, including forms for your state, see the [address] [place] [address] website (www.caringinfo.org/planning/advance-directives/). Follow-up care is a key part of your treatment and safety. Be sure to make and go to all appointments, and call your doctor if you are having problems. It's also a good idea to know your test results and keep a list of the medicines you take. What should you include in an advance directive? Many states have a unique advance directive form. (It may ask you to address specific issues.) Or you might use a universal form that's approved by many states. If your form doesn't tell you what to address, it may be hard to know what to include in your advance directive. Use the questions below to help you get started. Who do you want to make decisions about your medical care if you are not able to?   What life-support measures do you want if you

## 2024-02-07 ENCOUNTER — HOSPITAL ENCOUNTER (OUTPATIENT)
Age: 79
Setting detail: SPECIMEN
Discharge: HOME | End: 2024-02-07

## 2024-02-07 DIAGNOSIS — R73.9 HYPERGLYCEMIA: ICD-10-CM

## 2024-02-07 DIAGNOSIS — N18.31 STAGE 3A CHRONIC KIDNEY DISEASE (HCC): ICD-10-CM

## 2024-02-07 DIAGNOSIS — I10 ESSENTIAL HYPERTENSION: ICD-10-CM

## 2024-02-07 LAB
ALBUMIN SERPL-MCNC: 4 G/DL (ref 3.5–5.2)
ALBUMIN/GLOB SERPL: 1 {RATIO} (ref 1–2.5)
ALP SERPL-CCNC: 53 U/L (ref 35–104)
ALT SERPL-CCNC: 9 U/L (ref 10–35)
ANION GAP SERPL CALCULATED.3IONS-SCNC: 12 MMOL/L (ref 9–16)
AST SERPL-CCNC: 21 U/L (ref 10–35)
BILIRUB SERPL-MCNC: 0.6 MG/DL (ref 0–1.2)
BUN SERPL-MCNC: 18 MG/DL (ref 8–23)
CALCIUM SERPL-MCNC: 9.5 MG/DL (ref 8.6–10.4)
CHLORIDE SERPL-SCNC: 103 MMOL/L (ref 98–107)
CO2 SERPL-SCNC: 24 MMOL/L (ref 20–31)
CREAT SERPL-MCNC: 1.2 MG/DL (ref 0.5–0.9)
ERYTHROCYTE [DISTWIDTH] IN BLOOD BY AUTOMATED COUNT: 14.5 % (ref 11.8–14.4)
EST. AVERAGE GLUCOSE BLD GHB EST-MCNC: 108 MG/DL
GFR SERPL CREATININE-BSD FRML MDRD: 48 ML/MIN/1.73M2
GLUCOSE SERPL-MCNC: 97 MG/DL (ref 74–99)
HBA1C MFR BLD: 5.4 % (ref 4–6)
HCT VFR BLD AUTO: 43.2 % (ref 36.3–47.1)
HGB BLD-MCNC: 13.1 G/DL (ref 11.9–15.1)
MCH RBC QN AUTO: 27.2 PG (ref 25.2–33.5)
MCHC RBC AUTO-ENTMCNC: 30.3 G/DL (ref 28.4–34.8)
MCV RBC AUTO: 89.8 FL (ref 82.6–102.9)
NRBC BLD-RTO: 0 PER 100 WBC
PLATELET # BLD AUTO: 248 K/UL (ref 138–453)
PMV BLD AUTO: 13 FL (ref 8.1–13.5)
POTASSIUM SERPL-SCNC: 4.6 MMOL/L (ref 3.7–5.3)
PROT SERPL-MCNC: 7.3 G/DL (ref 6.6–8.7)
RBC # BLD AUTO: 4.81 M/UL (ref 3.95–5.11)
SODIUM SERPL-SCNC: 139 MMOL/L (ref 136–145)
WBC OTHER # BLD: 9.3 K/UL (ref 3.5–11.3)

## 2024-02-14 ENCOUNTER — OFFICE VISIT (OUTPATIENT)
Dept: FAMILY MEDICINE CLINIC | Age: 79
End: 2024-02-14
Payer: MEDICARE

## 2024-02-14 VITALS
TEMPERATURE: 98.2 F | BODY MASS INDEX: 36.96 KG/M2 | RESPIRATION RATE: 16 BRPM | HEART RATE: 68 BPM | SYSTOLIC BLOOD PRESSURE: 136 MMHG | WEIGHT: 229 LBS | OXYGEN SATURATION: 98 % | DIASTOLIC BLOOD PRESSURE: 78 MMHG

## 2024-02-14 DIAGNOSIS — Z85.42 HISTORY OF ENDOMETRIAL CANCER: ICD-10-CM

## 2024-02-14 DIAGNOSIS — R73.9 HYPERGLYCEMIA: ICD-10-CM

## 2024-02-14 DIAGNOSIS — K21.9 GASTROESOPHAGEAL REFLUX DISEASE WITHOUT ESOPHAGITIS: ICD-10-CM

## 2024-02-14 DIAGNOSIS — I10 ESSENTIAL HYPERTENSION: Primary | ICD-10-CM

## 2024-02-14 DIAGNOSIS — M19.90 OSTEOARTHRITIS, UNSPECIFIED OSTEOARTHRITIS TYPE, UNSPECIFIED SITE: ICD-10-CM

## 2024-02-14 DIAGNOSIS — N18.31 STAGE 3A CHRONIC KIDNEY DISEASE (HCC): ICD-10-CM

## 2024-02-14 PROCEDURE — G8417 CALC BMI ABV UP PARAM F/U: HCPCS | Performed by: FAMILY MEDICINE

## 2024-02-14 PROCEDURE — G8484 FLU IMMUNIZE NO ADMIN: HCPCS | Performed by: FAMILY MEDICINE

## 2024-02-14 PROCEDURE — 3075F SYST BP GE 130 - 139MM HG: CPT | Performed by: FAMILY MEDICINE

## 2024-02-14 PROCEDURE — 1090F PRES/ABSN URINE INCON ASSESS: CPT | Performed by: FAMILY MEDICINE

## 2024-02-14 PROCEDURE — 1036F TOBACCO NON-USER: CPT | Performed by: FAMILY MEDICINE

## 2024-02-14 PROCEDURE — G8427 DOCREV CUR MEDS BY ELIG CLIN: HCPCS | Performed by: FAMILY MEDICINE

## 2024-02-14 PROCEDURE — 1123F ACP DISCUSS/DSCN MKR DOCD: CPT | Performed by: FAMILY MEDICINE

## 2024-02-14 PROCEDURE — 3078F DIAST BP <80 MM HG: CPT | Performed by: FAMILY MEDICINE

## 2024-02-14 PROCEDURE — 99214 OFFICE O/P EST MOD 30 MIN: CPT | Performed by: FAMILY MEDICINE

## 2024-02-14 PROCEDURE — G8399 PT W/DXA RESULTS DOCUMENT: HCPCS | Performed by: FAMILY MEDICINE

## 2024-02-14 NOTE — PROGRESS NOTES
Rhea Blackburn MD    [facility]  [facility]  [address], [address]  [facility] [address]  Dept: [phone]  Dept Fax: [phone]     Patient ID: Raya Méndez is a 78 y.o. female.    HPI    Established patient here today for f/u on chronic medical problems, go over labs and/or diagnostic studies, and medication refills. Pt denies any fever or chills.  Pt today denies any HA, chest pain, or SOB.  Pt denies any N/V/D/C or abdominal pain. Pt with occasional heartburn, but stable on meds. Pt with arthralgias on and off, but stable on meds.     Otherwise pt doing well on current tx and no other concerns today.     The patient's past medical, surgical, social, and family history as well as his current medications and allergies were reviewed as documented in today's encounter.      My previous office notes, consult notes, labs and diagnostic studies were reviewed prior to and during encounter.    Current Outpatient Medications on File Prior to Visit   Medication Sig Dispense Refill    losartan (COZAAR) 100 MG tablet Take 1 tablet by mouth once daily 90 tablet 3    Naproxen Sodium 220 MG CAPS Take by mouth PRN      omeprazole (PRILOSEC OTC) 20 MG tablet Take 1 tablet by mouth daily      Cholecalciferol (VITAMIN D) 2000 UNIT CAPS capsule Take 1 capsule by mouth daily      Ibuprofen (MOTRIN IB PO) Take 200 mg by mouth as needed.       No current facility-administered medications on file prior to visit.        Subjective:     Review of Systems   Constitutional:  Negative for appetite change and fever.   HENT:  Negative for congestion, ear pain, rhinorrhea and sore throat.    Respiratory:  Negative for cough, chest tightness and shortness of breath.    Cardiovascular:  Negative for chest pain and palpitations.   Gastrointestinal:  Negative for abdominal distention, abdominal pain, constipation, diarrhea, nausea and vomiting.   Genitourinary:  Negative for difficulty

## 2024-02-23 ENCOUNTER — HOSPITAL ENCOUNTER (OUTPATIENT)
Dept: WOMENS IMAGING | Age: 79
End: 2024-02-23
Payer: MEDICARE

## 2024-02-23 DIAGNOSIS — Z12.31 SCREENING MAMMOGRAM FOR BREAST CANCER: ICD-10-CM

## 2024-02-23 PROCEDURE — 77063 BREAST TOMOSYNTHESIS BI: CPT

## 2024-04-11 DIAGNOSIS — I10 ESSENTIAL HYPERTENSION: ICD-10-CM

## 2024-04-11 RX ORDER — LOSARTAN POTASSIUM 100 MG/1
TABLET ORAL
Qty: 90 TABLET | Refills: 0 | Status: SHIPPED | OUTPATIENT
Start: 2024-04-11

## 2024-05-14 ENCOUNTER — OFFICE VISIT (OUTPATIENT)
Dept: FAMILY MEDICINE CLINIC | Age: 79
End: 2024-05-14

## 2024-05-14 VITALS
TEMPERATURE: 97.9 F | BODY MASS INDEX: 36.32 KG/M2 | DIASTOLIC BLOOD PRESSURE: 66 MMHG | SYSTOLIC BLOOD PRESSURE: 134 MMHG | OXYGEN SATURATION: 97 % | WEIGHT: 225 LBS | HEART RATE: 78 BPM | RESPIRATION RATE: 16 BRPM

## 2024-05-14 DIAGNOSIS — K21.9 GASTROESOPHAGEAL REFLUX DISEASE WITHOUT ESOPHAGITIS: ICD-10-CM

## 2024-05-14 DIAGNOSIS — M19.90 OSTEOARTHRITIS, UNSPECIFIED OSTEOARTHRITIS TYPE, UNSPECIFIED SITE: ICD-10-CM

## 2024-05-14 DIAGNOSIS — N18.31 STAGE 3A CHRONIC KIDNEY DISEASE (HCC): ICD-10-CM

## 2024-05-14 DIAGNOSIS — I10 ESSENTIAL HYPERTENSION: Primary | ICD-10-CM

## 2024-05-14 DIAGNOSIS — E55.9 VITAMIN D DEFICIENCY: ICD-10-CM

## 2024-05-14 DIAGNOSIS — Z85.42 HISTORY OF ENDOMETRIAL CANCER: ICD-10-CM

## 2024-05-14 DIAGNOSIS — R73.9 HYPERGLYCEMIA: ICD-10-CM

## 2024-05-14 DIAGNOSIS — Z13.220 SCREENING CHOLESTEROL LEVEL: ICD-10-CM

## 2024-05-14 ASSESSMENT — ENCOUNTER SYMPTOMS
CHEST TIGHTNESS: 0
CONSTIPATION: 0
NAUSEA: 0
DIARRHEA: 0
VOMITING: 0
SORE THROAT: 0
ABDOMINAL DISTENTION: 0
COUGH: 0
ABDOMINAL PAIN: 0
BACK PAIN: 0
SHORTNESS OF BREATH: 0

## 2024-05-14 ASSESSMENT — PATIENT HEALTH QUESTIONNAIRE - PHQ9
SUM OF ALL RESPONSES TO PHQ QUESTIONS 1-9: 1
2. FEELING DOWN, DEPRESSED OR HOPELESS: SEVERAL DAYS
SUM OF ALL RESPONSES TO PHQ QUESTIONS 1-9: 1
SUM OF ALL RESPONSES TO PHQ9 QUESTIONS 1 & 2: 1
1. LITTLE INTEREST OR PLEASURE IN DOING THINGS: NOT AT ALL
SUM OF ALL RESPONSES TO PHQ QUESTIONS 1-9: 1
SUM OF ALL RESPONSES TO PHQ QUESTIONS 1-9: 1

## 2024-05-14 NOTE — PROGRESS NOTES
disease (HCC)  Comprehensive Metabolic Panel      4. Gastroesophageal reflux disease without esophagitis        5. Osteoarthritis, unspecified osteoarthritis type, unspecified site        6. History of endometrial cancer        7. Vitamin D deficiency  Vitamin D 25 Hydroxy      8. Screening cholesterol level  Lipid Panel            Plan:     Orders Placed This Encounter   Procedures    CBC     Standing Status:   Future     Standing Expiration Date:   5/14/2025    Comprehensive Metabolic Panel     Standing Status:   Future     Standing Expiration Date:   5/14/2025    Lipid Panel     Standing Status:   Future     Standing Expiration Date:   5/14/2025     Order Specific Question:   Is Patient Fasting?/# of Hours     Answer:   8-10 Hours    Hemoglobin A1C     Standing Status:   Future     Standing Expiration Date:   5/14/2025    Vitamin D 25 Hydroxy     Standing Status:   Future     Standing Expiration Date:   5/14/2025            Will cont with current treatment as pt is currently stable on current treatment    Will cont with the Cozaar as prescribed for BP control    Continue to watch carbs secondary to increased BS    Stay well hydrated and NSAID's in moderation and only use the Ibuprofen when her pain is more severe    Cont OTC Tylenol and OTC Voltaren gel prn    Will cont with the Omeprazole as prescribed     Rest of systems unchanged, continue current treatments.    Medications, labs, diagnostic studies, consultations and follow-up as documented in this encounter. Rest of systems unchanged, continue current treatments    On this date May 14, 2024,  I have spent greater than 50% of visit reviewing previous notes, test results and face to face with the patient discussing the diagnoses, importance of compliance with the treatment plan, counseling, coordinating care as well as documenting on the day of the visit.    Rhea Blackburn MD

## 2024-07-15 DIAGNOSIS — I10 ESSENTIAL HYPERTENSION: ICD-10-CM

## 2024-07-15 RX ORDER — LOSARTAN POTASSIUM 100 MG/1
TABLET ORAL
Qty: 90 TABLET | Refills: 3 | Status: SHIPPED | OUTPATIENT
Start: 2024-07-15

## 2024-08-12 ENCOUNTER — HOSPITAL ENCOUNTER (OUTPATIENT)
Age: 79
Setting detail: SPECIMEN
Discharge: HOME | End: 2024-08-12

## 2024-08-12 DIAGNOSIS — E55.9 VITAMIN D DEFICIENCY: ICD-10-CM

## 2024-08-12 DIAGNOSIS — I10 ESSENTIAL HYPERTENSION: ICD-10-CM

## 2024-08-12 DIAGNOSIS — R73.9 HYPERGLYCEMIA: ICD-10-CM

## 2024-08-12 DIAGNOSIS — N18.31 STAGE 3A CHRONIC KIDNEY DISEASE (HCC): ICD-10-CM

## 2024-08-12 DIAGNOSIS — Z13.220 SCREENING CHOLESTEROL LEVEL: ICD-10-CM

## 2024-08-12 LAB
25(OH)D3 SERPL-MCNC: 46.1 NG/ML (ref 30–100)
ALBUMIN SERPL-MCNC: 4 G/DL (ref 3.5–5.2)
ALBUMIN/GLOB SERPL: 1 {RATIO} (ref 1–2.5)
ALP SERPL-CCNC: 55 U/L (ref 35–104)
ALT SERPL-CCNC: 10 U/L (ref 10–35)
ANION GAP SERPL CALCULATED.3IONS-SCNC: 10 MMOL/L (ref 9–16)
AST SERPL-CCNC: 19 U/L (ref 10–35)
BILIRUB SERPL-MCNC: 0.5 MG/DL (ref 0–1.2)
BUN SERPL-MCNC: 15 MG/DL (ref 8–23)
CALCIUM SERPL-MCNC: 9.6 MG/DL (ref 8.6–10.4)
CHLORIDE SERPL-SCNC: 103 MMOL/L (ref 98–107)
CHOLEST SERPL-MCNC: 134 MG/DL (ref 0–199)
CHOLESTEROL/HDL RATIO: 3
CO2 SERPL-SCNC: 26 MMOL/L (ref 20–31)
CREAT SERPL-MCNC: 1.2 MG/DL (ref 0.5–0.9)
ERYTHROCYTE [DISTWIDTH] IN BLOOD BY AUTOMATED COUNT: 14.5 % (ref 11.8–14.4)
EST. AVERAGE GLUCOSE BLD GHB EST-MCNC: 114 MG/DL
GFR, ESTIMATED: 46 ML/MIN/1.73M2
GLUCOSE SERPL-MCNC: 96 MG/DL (ref 74–99)
HBA1C MFR BLD: 5.6 % (ref 4–6)
HCT VFR BLD AUTO: 41.4 % (ref 36.3–47.1)
HDLC SERPL-MCNC: 46 MG/DL
HGB BLD-MCNC: 12.8 G/DL (ref 11.9–15.1)
LDLC SERPL CALC-MCNC: 59 MG/DL (ref 0–100)
MCH RBC QN AUTO: 27.5 PG (ref 25.2–33.5)
MCHC RBC AUTO-ENTMCNC: 30.9 G/DL (ref 28.4–34.8)
MCV RBC AUTO: 89 FL (ref 82.6–102.9)
NRBC BLD-RTO: 0 PER 100 WBC
PLATELET # BLD AUTO: 287 K/UL (ref 138–453)
PMV BLD AUTO: 12.7 FL (ref 8.1–13.5)
POTASSIUM SERPL-SCNC: 4.7 MMOL/L (ref 3.7–5.3)
PROT SERPL-MCNC: 7.2 G/DL (ref 6.6–8.7)
RBC # BLD AUTO: 4.65 M/UL (ref 3.95–5.11)
SODIUM SERPL-SCNC: 139 MMOL/L (ref 136–145)
TRIGL SERPL-MCNC: 143 MG/DL
VLDLC SERPL CALC-MCNC: 29 MG/DL
WBC OTHER # BLD: 9.1 K/UL (ref 3.5–11.3)

## 2024-08-16 ENCOUNTER — OFFICE VISIT (OUTPATIENT)
Dept: FAMILY MEDICINE CLINIC | Age: 79
End: 2024-08-16

## 2024-08-16 VITALS
DIASTOLIC BLOOD PRESSURE: 80 MMHG | BODY MASS INDEX: 36.35 KG/M2 | HEIGHT: 66 IN | OXYGEN SATURATION: 98 % | HEART RATE: 69 BPM | WEIGHT: 226.2 LBS | SYSTOLIC BLOOD PRESSURE: 136 MMHG

## 2024-08-16 DIAGNOSIS — I10 ESSENTIAL HYPERTENSION: Primary | ICD-10-CM

## 2024-08-16 DIAGNOSIS — Z12.11 COLON CANCER SCREENING: ICD-10-CM

## 2024-08-16 DIAGNOSIS — N18.31 STAGE 3A CHRONIC KIDNEY DISEASE (HCC): ICD-10-CM

## 2024-08-16 DIAGNOSIS — M19.90 OSTEOARTHRITIS, UNSPECIFIED OSTEOARTHRITIS TYPE, UNSPECIFIED SITE: ICD-10-CM

## 2024-08-16 DIAGNOSIS — E55.9 VITAMIN D DEFICIENCY: ICD-10-CM

## 2024-08-16 DIAGNOSIS — R73.9 HYPERGLYCEMIA: ICD-10-CM

## 2024-08-16 DIAGNOSIS — Z85.42 HISTORY OF ENDOMETRIAL CANCER: ICD-10-CM

## 2024-08-16 DIAGNOSIS — K21.9 GASTROESOPHAGEAL REFLUX DISEASE WITHOUT ESOPHAGITIS: ICD-10-CM

## 2024-08-16 ASSESSMENT — ENCOUNTER SYMPTOMS
ABDOMINAL DISTENTION: 0
SORE THROAT: 0
DIARRHEA: 0
RHINORRHEA: 0
CONSTIPATION: 0
BACK PAIN: 0
VOMITING: 0
COUGH: 0
ABDOMINAL PAIN: 0
SHORTNESS OF BREATH: 0
CHEST TIGHTNESS: 0
NAUSEA: 0

## 2024-08-16 NOTE — PROGRESS NOTES
Rhea Blackburn MD    [facility]  [facility] MEDICINE  [id number], [address]  [facility] [address]  Dept: [phone]  Dept Fax: [phone]     Patient ID: Raya Méndez is a 78 y.o. female.    HPI    Established patient here today for f/u on chronic medical problems, go over labs and/or diagnostic studies, and medication refills. Pt denies any fever or chills.  Pt today denies any HA, chest pain, or SOB.  Pt denies any N/V/D/C or abdominal pain. Pt with occasional heartburn, but stable on meds. Pt with arthralgias on and off, but stable on meds.     Otherwise pt doing well on current tx and no other concerns today.     The patient's past medical, surgical, social, and family history as well as his current medications and allergies were reviewed as documented in today's encounter.      My previous office notes, consult notes, labs and diagnostic studies were reviewed prior to and during encounter.    Current Outpatient Medications on File Prior to Visit   Medication Sig Dispense Refill    losartan (COZAAR) 100 MG tablet Take 1 tablet by mouth once daily 90 tablet 3    Naproxen Sodium 220 MG CAPS Take by mouth PRN      omeprazole (PRILOSEC OTC) 20 MG tablet Take 1 tablet by mouth daily      Cholecalciferol (VITAMIN D) 2000 UNIT CAPS capsule Take 1 capsule by mouth daily      Ibuprofen (MOTRIN IB PO) Take 200 mg by mouth as needed.       No current facility-administered medications on file prior to visit.        Subjective:     Review of Systems   Constitutional:  Negative for appetite change and fever.   HENT:  Negative for congestion, ear pain, rhinorrhea and sore throat.    Respiratory:  Negative for cough, chest tightness and shortness of breath.    Cardiovascular:  Negative for chest pain and palpitations.   Gastrointestinal:  Negative for abdominal distention, abdominal pain, constipation, diarrhea, nausea and vomiting.        Occ heartburn   Genitourinary:  Negative

## 2024-09-21 LAB — NONINV COLON CA DNA+OCC BLD SCRN STL QL: NEGATIVE

## 2024-11-15 ENCOUNTER — OFFICE VISIT (OUTPATIENT)
Dept: FAMILY MEDICINE CLINIC | Age: 79
End: 2024-11-15

## 2024-11-15 VITALS
BODY MASS INDEX: 36.66 KG/M2 | OXYGEN SATURATION: 100 % | HEART RATE: 70 BPM | WEIGHT: 227 LBS | DIASTOLIC BLOOD PRESSURE: 70 MMHG | SYSTOLIC BLOOD PRESSURE: 118 MMHG

## 2024-11-15 DIAGNOSIS — K21.9 GASTROESOPHAGEAL REFLUX DISEASE WITHOUT ESOPHAGITIS: ICD-10-CM

## 2024-11-15 DIAGNOSIS — M19.90 OSTEOARTHRITIS, UNSPECIFIED OSTEOARTHRITIS TYPE, UNSPECIFIED SITE: ICD-10-CM

## 2024-11-15 DIAGNOSIS — E55.9 VITAMIN D DEFICIENCY: ICD-10-CM

## 2024-11-15 DIAGNOSIS — N18.31 STAGE 3A CHRONIC KIDNEY DISEASE (HCC): ICD-10-CM

## 2024-11-15 DIAGNOSIS — Z23 NEED FOR VACCINATION: ICD-10-CM

## 2024-11-15 DIAGNOSIS — I10 ESSENTIAL HYPERTENSION: Primary | ICD-10-CM

## 2024-11-15 DIAGNOSIS — R73.9 HYPERGLYCEMIA: ICD-10-CM

## 2024-11-15 DIAGNOSIS — Z85.42 HISTORY OF ENDOMETRIAL CANCER: ICD-10-CM

## 2024-11-15 SDOH — ECONOMIC STABILITY: FOOD INSECURITY: WITHIN THE PAST 12 MONTHS, YOU WORRIED THAT YOUR FOOD WOULD RUN OUT BEFORE YOU GOT MONEY TO BUY MORE.: NEVER TRUE

## 2024-11-15 SDOH — ECONOMIC STABILITY: INCOME INSECURITY: HOW HARD IS IT FOR YOU TO PAY FOR THE VERY BASICS LIKE FOOD, HOUSING, MEDICAL CARE, AND HEATING?: NOT HARD AT ALL

## 2024-11-15 SDOH — ECONOMIC STABILITY: FOOD INSECURITY: WITHIN THE PAST 12 MONTHS, THE FOOD YOU BOUGHT JUST DIDN'T LAST AND YOU DIDN'T HAVE MONEY TO GET MORE.: NEVER TRUE

## 2024-11-15 ASSESSMENT — ENCOUNTER SYMPTOMS
SORE THROAT: 0
DIARRHEA: 0
COUGH: 0
ABDOMINAL DISTENTION: 0
VOMITING: 0
NAUSEA: 0
ABDOMINAL PAIN: 0
RHINORRHEA: 0
SHORTNESS OF BREATH: 0
CHEST TIGHTNESS: 0
BACK PAIN: 0
CONSTIPATION: 0

## 2024-11-15 NOTE — PROGRESS NOTES
Rhea Blackburn MD    [facility]  [facility] MEDICINE  [id number], [address]  [facility] [address]  Dept: [phone]  Dept Fax: [phone]     Patient ID: Raya Méndez is a 78 y.o. female.    HPI    Established patient here today for f/u on chronic medical problems, go over labs and/or diagnostic studies, and medication refills. Pt denies any fever or chills.  Pt today denies any HA, chest pain, or SOB.  Pt denies any N/V/D/C or abdominal pain. Pt with occasional heartburn, but stable on meds. Pt with arthralgias on and off, but stable on meds.     Otherwise pt doing well on current tx and no other concerns today.     The patient's past medical, surgical, social, and family history as well as his current medications and allergies were reviewed as documented in today's encounter.      My previous office notes, consult notes, labs and diagnostic studies were reviewed prior to and during encounter.    Current Outpatient Medications on File Prior to Visit   Medication Sig Dispense Refill    losartan (COZAAR) 100 MG tablet Take 1 tablet by mouth once daily 90 tablet 3    Naproxen Sodium 220 MG CAPS Take by mouth PRN      omeprazole (PRILOSEC OTC) 20 MG tablet Take 1 tablet by mouth daily      Cholecalciferol (VITAMIN D) 2000 UNIT CAPS capsule Take 1 capsule by mouth daily      Ibuprofen (MOTRIN IB PO) Take 200 mg by mouth as needed.       No current facility-administered medications on file prior to visit.        Subjective:     Review of Systems   Constitutional:  Negative for appetite change and fever.   HENT:  Negative for congestion, ear pain, rhinorrhea and sore throat.    Respiratory:  Negative for cough, chest tightness and shortness of breath.    Cardiovascular:  Negative for chest pain and palpitations.   Gastrointestinal:  Negative for abdominal distention, abdominal pain, constipation, diarrhea, nausea and vomiting.        Occ heartburn   Genitourinary:  Negative

## 2024-11-18 ENCOUNTER — TELEMEDICINE (OUTPATIENT)
Dept: FAMILY MEDICINE CLINIC | Age: 79
End: 2024-11-18

## 2024-11-18 DIAGNOSIS — Z00.00 MEDICARE ANNUAL WELLNESS VISIT, SUBSEQUENT: Primary | ICD-10-CM

## 2024-11-18 ASSESSMENT — PATIENT HEALTH QUESTIONNAIRE - PHQ9
SUM OF ALL RESPONSES TO PHQ QUESTIONS 1-9: 0
SUM OF ALL RESPONSES TO PHQ QUESTIONS 1-9: 0
SUM OF ALL RESPONSES TO PHQ9 QUESTIONS 1 & 2: 0
SUM OF ALL RESPONSES TO PHQ QUESTIONS 1-9: 0
1. LITTLE INTEREST OR PLEASURE IN DOING THINGS: NOT AT ALL
2. FEELING DOWN, DEPRESSED OR HOPELESS: NOT AT ALL
SUM OF ALL RESPONSES TO PHQ QUESTIONS 1-9: 0

## 2024-11-18 ASSESSMENT — LIFESTYLE VARIABLES: HOW OFTEN DO YOU HAVE A DRINK CONTAINING ALCOHOL: NEVER

## 2024-11-18 NOTE — PROGRESS NOTES
Medicare Annual Wellness Visit    Raya Méndez is here for Medicare AW    Assessment & Plan     Recommendations for Preventive Services Due: see orders and patient instructions/AVS.  Recommended screening schedule for the next 5-10 years is provided to the patient in written form: see Patient Instructions/AVS.     No follow-ups on file.     Subjective     Patient's complete Health Risk Assessment and screening values have been reviewed and are found in Flowsheets. The following problems were reviewed today and where indicated follow up appointments were made and/or referrals ordered.    Positive Risk Factor Screenings with Interventions:    Fall Risk:  Do you feel unsteady or are you worried about falling? : (!) yes  2 or more falls in past year?: no  Fall with injury in past year?: no     Interventions:    Reviewed medications, home hazards, visual acuity, and co-morbidities that can increase risk for falls  See AVS for additional education material             Inactivity:  On average, how many days per week do you engage in moderate to strenuous exercise (like a brisk walk)?: 2 days (!) Abnormal  On average, how many minutes do you engage in exercise at this level?: 20 min    Interventions:  Recommendations: patient agrees to exercise for at least 150 minutes/week    Poor Eating Habits/Diet:  Do you eat balanced/healthy meals regularly?: (!) No    Interventions:  low carbohydrate diet, exercise for at least 150 minutes/week    Abnormal BMI (obese):  There is no height or weight on file to calculate BMI. (!) Abnormal    Interventions:  low carbohydrate diet, exercise for at least 150 minutes/week             Advanced Directives:  Do you have a Living Will?: (!) No (patient is working with an  to finish.)    Intervention:  has NO advanced directive - information provided                     Objective    Patient-Reported Vitals  No data recorded               Allergies   Allergen Reactions    Minocin

## 2025-02-10 ENCOUNTER — HOSPITAL ENCOUNTER (OUTPATIENT)
Age: 80
Setting detail: SPECIMEN
Discharge: HOME | End: 2025-02-10

## 2025-02-10 DIAGNOSIS — R73.9 HYPERGLYCEMIA: ICD-10-CM

## 2025-02-10 DIAGNOSIS — I10 ESSENTIAL HYPERTENSION: ICD-10-CM

## 2025-02-10 DIAGNOSIS — E55.9 VITAMIN D DEFICIENCY: ICD-10-CM

## 2025-02-10 DIAGNOSIS — N18.31 STAGE 3A CHRONIC KIDNEY DISEASE (HCC): ICD-10-CM

## 2025-02-10 LAB
25(OH)D3 SERPL-MCNC: 42.3 NG/ML (ref 30–100)
ALBUMIN SERPL-MCNC: 3.9 G/DL (ref 3.5–5.2)
ALBUMIN/GLOB SERPL: 1.1 {RATIO} (ref 1–2.5)
ALP SERPL-CCNC: 61 U/L (ref 35–104)
ALT SERPL-CCNC: 11 U/L (ref 10–35)
ANION GAP SERPL CALCULATED.3IONS-SCNC: 11 MMOL/L (ref 9–16)
AST SERPL-CCNC: 18 U/L (ref 10–35)
BILIRUB SERPL-MCNC: 0.4 MG/DL (ref 0–1.2)
BUN SERPL-MCNC: 15 MG/DL (ref 8–23)
CALCIUM SERPL-MCNC: 9.8 MG/DL (ref 8.6–10.4)
CHLORIDE SERPL-SCNC: 103 MMOL/L (ref 98–107)
CO2 SERPL-SCNC: 26 MMOL/L (ref 20–31)
CREAT SERPL-MCNC: 1.1 MG/DL (ref 0.6–0.9)
ERYTHROCYTE [DISTWIDTH] IN BLOOD BY AUTOMATED COUNT: 14.6 % (ref 11.8–14.4)
EST. AVERAGE GLUCOSE BLD GHB EST-MCNC: 108 MG/DL
GFR, ESTIMATED: 52 ML/MIN/1.73M2
GLUCOSE SERPL-MCNC: 98 MG/DL (ref 74–99)
HBA1C MFR BLD: 5.4 % (ref 4–6)
HCT VFR BLD AUTO: 42.5 % (ref 36.3–47.1)
HGB BLD-MCNC: 12.8 G/DL (ref 11.9–15.1)
MCH RBC QN AUTO: 26.8 PG (ref 25.2–33.5)
MCHC RBC AUTO-ENTMCNC: 30.1 G/DL (ref 28.4–34.8)
MCV RBC AUTO: 88.9 FL (ref 82.6–102.9)
NRBC BLD-RTO: 0 PER 100 WBC
PLATELET # BLD AUTO: 315 K/UL (ref 138–453)
PMV BLD AUTO: 12.9 FL (ref 8.1–13.5)
POTASSIUM SERPL-SCNC: 4.8 MMOL/L (ref 3.7–5.3)
PROT SERPL-MCNC: 7.4 G/DL (ref 6.6–8.7)
RBC # BLD AUTO: 4.78 M/UL (ref 3.95–5.11)
SODIUM SERPL-SCNC: 140 MMOL/L (ref 136–145)
WBC OTHER # BLD: 10.1 K/UL (ref 3.5–11.3)

## 2025-04-08 ENCOUNTER — OFFICE VISIT (OUTPATIENT)
Dept: FAMILY MEDICINE CLINIC | Age: 80
End: 2025-04-08

## 2025-04-08 VITALS
RESPIRATION RATE: 16 BRPM | DIASTOLIC BLOOD PRESSURE: 72 MMHG | OXYGEN SATURATION: 98 % | HEART RATE: 72 BPM | TEMPERATURE: 96.9 F | BODY MASS INDEX: 37.46 KG/M2 | SYSTOLIC BLOOD PRESSURE: 126 MMHG | WEIGHT: 232 LBS

## 2025-04-08 DIAGNOSIS — Z12.31 ENCOUNTER FOR SCREENING MAMMOGRAM FOR HIGH-RISK PATIENT: ICD-10-CM

## 2025-04-08 DIAGNOSIS — N18.31 STAGE 3A CHRONIC KIDNEY DISEASE (HCC): ICD-10-CM

## 2025-04-08 DIAGNOSIS — K21.9 GASTROESOPHAGEAL REFLUX DISEASE WITHOUT ESOPHAGITIS: ICD-10-CM

## 2025-04-08 DIAGNOSIS — R73.9 HYPERGLYCEMIA: ICD-10-CM

## 2025-04-08 DIAGNOSIS — I10 ESSENTIAL HYPERTENSION: Primary | ICD-10-CM

## 2025-04-08 DIAGNOSIS — M19.90 OSTEOARTHRITIS, UNSPECIFIED OSTEOARTHRITIS TYPE, UNSPECIFIED SITE: ICD-10-CM

## 2025-04-08 DIAGNOSIS — E55.9 VITAMIN D DEFICIENCY: ICD-10-CM

## 2025-04-08 SDOH — ECONOMIC STABILITY: FOOD INSECURITY: WITHIN THE PAST 12 MONTHS, THE FOOD YOU BOUGHT JUST DIDN'T LAST AND YOU DIDN'T HAVE MONEY TO GET MORE.: NEVER TRUE

## 2025-04-08 SDOH — ECONOMIC STABILITY: FOOD INSECURITY: WITHIN THE PAST 12 MONTHS, YOU WORRIED THAT YOUR FOOD WOULD RUN OUT BEFORE YOU GOT MONEY TO BUY MORE.: NEVER TRUE

## 2025-04-08 ASSESSMENT — ENCOUNTER SYMPTOMS
CONSTIPATION: 0
ABDOMINAL DISTENTION: 0
ABDOMINAL PAIN: 0
VOMITING: 0
NAUSEA: 0
DIARRHEA: 0
RHINORRHEA: 0
BACK PAIN: 0
SORE THROAT: 0
COUGH: 0
CHEST TIGHTNESS: 0
SHORTNESS OF BREATH: 0

## 2025-04-08 ASSESSMENT — PATIENT HEALTH QUESTIONNAIRE - PHQ9
SUM OF ALL RESPONSES TO PHQ QUESTIONS 1-9: 2
2. FEELING DOWN, DEPRESSED OR HOPELESS: SEVERAL DAYS
1. LITTLE INTEREST OR PLEASURE IN DOING THINGS: SEVERAL DAYS
SUM OF ALL RESPONSES TO PHQ QUESTIONS 1-9: 2

## 2025-04-08 NOTE — PROGRESS NOTES
Gastroesophageal reflux disease without esophagitis        6. Osteoarthritis, unspecified osteoarthritis type, unspecified site        7. Encounter for screening mammogram for high-risk patient  [facility] DIGITAL SCREEN W OR WO CAD BILATERAL            Plan:     Orders Placed This Encounter   Procedures    [facility]AS[facility]ER DIGITAL SCREEN W OR WO CAD BILATERAL     Standing Status:   Future     Expected Date:   5/8/2025     Expiration Date:   6/8/2026                  Will cont with current treatment as pt is currently stable on current treatment    Will cont with the Cozaar as prescribed for BP control    Continue to watch carbs secondary to increased BS    Stay well hydrated and NSAID's in moderation and only use the Ibuprofen when her pain is more severe and will cont to monitor her kidney function - it is stable and did improve    Cont OTC Tylenol and OTC Voltaren gel prn and the Ibuprofen very sparingly    Will cont with the Omeprazole as prescribed     Rest of systems unchanged, continue current treatments.    Medications, labs, diagnostic studies, consultations and follow-up as documented in this encounter. Rest of systems unchanged, continue current treatments    On this date April 8, 2025,  I have spent greater than 50% of visit reviewing previous notes, test results and face to face with the patient discussing the diagnoses, importance of compliance with the treatment plan, counseling, coordinating care as well as documenting on the day of the visit.    Rhea Blackburn MD

## 2025-05-16 ENCOUNTER — RESULTS FOLLOW-UP (OUTPATIENT)
Dept: FAMILY MEDICINE CLINIC | Age: 80
End: 2025-05-16

## 2025-05-16 ENCOUNTER — HOSPITAL ENCOUNTER (OUTPATIENT)
Dept: WOMENS IMAGING | Age: 80
Discharge: HOME | End: 2025-05-18
Payer: MEDICARE

## 2025-05-16 DIAGNOSIS — Z12.31 ENCOUNTER FOR SCREENING MAMMOGRAM FOR HIGH-RISK PATIENT: ICD-10-CM

## 2025-05-16 PROCEDURE — 77063 BREAST TOMOSYNTHESIS BI: CPT

## 2025-07-09 ENCOUNTER — OFFICE VISIT (OUTPATIENT)
Dept: FAMILY MEDICINE CLINIC | Age: 80
End: 2025-07-09

## 2025-07-09 VITALS
WEIGHT: 235 LBS | HEART RATE: 70 BPM | OXYGEN SATURATION: 98 % | RESPIRATION RATE: 16 BRPM | SYSTOLIC BLOOD PRESSURE: 136 MMHG | BODY MASS INDEX: 37.95 KG/M2 | TEMPERATURE: 97.1 F | DIASTOLIC BLOOD PRESSURE: 74 MMHG

## 2025-07-09 DIAGNOSIS — R53.83 OTHER FATIGUE: ICD-10-CM

## 2025-07-09 DIAGNOSIS — I10 ESSENTIAL HYPERTENSION: Primary | ICD-10-CM

## 2025-07-09 DIAGNOSIS — Z85.42 HISTORY OF ENDOMETRIAL CANCER: ICD-10-CM

## 2025-07-09 DIAGNOSIS — N18.31 STAGE 3A CHRONIC KIDNEY DISEASE (HCC): ICD-10-CM

## 2025-07-09 DIAGNOSIS — M79.89 FOOT SWELLING: ICD-10-CM

## 2025-07-09 DIAGNOSIS — M19.90 OSTEOARTHRITIS, UNSPECIFIED OSTEOARTHRITIS TYPE, UNSPECIFIED SITE: ICD-10-CM

## 2025-07-09 DIAGNOSIS — K21.9 GASTROESOPHAGEAL REFLUX DISEASE WITHOUT ESOPHAGITIS: ICD-10-CM

## 2025-07-09 DIAGNOSIS — E55.9 VITAMIN D DEFICIENCY: ICD-10-CM

## 2025-07-09 DIAGNOSIS — R73.9 HYPERGLYCEMIA: ICD-10-CM

## 2025-07-09 RX ORDER — FUROSEMIDE 20 MG/1
TABLET ORAL
Qty: 30 TABLET | Refills: 3 | Status: SHIPPED | OUTPATIENT
Start: 2025-07-09

## 2025-07-09 RX ORDER — LOSARTAN POTASSIUM 100 MG/1
100 TABLET ORAL DAILY
Qty: 90 TABLET | Refills: 3 | Status: SHIPPED | OUTPATIENT
Start: 2025-07-09

## 2025-07-09 RX ORDER — OMEPRAZOLE 20 MG/1
20 TABLET, DELAYED RELEASE ORAL DAILY
Qty: 90 TABLET | Refills: 3 | Status: SHIPPED | OUTPATIENT
Start: 2025-07-09

## 2025-07-09 SDOH — ECONOMIC STABILITY: FOOD INSECURITY: WITHIN THE PAST 12 MONTHS, YOU WORRIED THAT YOUR FOOD WOULD RUN OUT BEFORE YOU GOT MONEY TO BUY MORE.: NEVER TRUE

## 2025-07-09 SDOH — ECONOMIC STABILITY: FOOD INSECURITY: WITHIN THE PAST 12 MONTHS, THE FOOD YOU BOUGHT JUST DIDN'T LAST AND YOU DIDN'T HAVE MONEY TO GET MORE.: NEVER TRUE

## 2025-07-09 ASSESSMENT — ENCOUNTER SYMPTOMS
CONSTIPATION: 0
SHORTNESS OF BREATH: 0
RHINORRHEA: 0
SORE THROAT: 0
DIARRHEA: 0
VOMITING: 0
COUGH: 0
BACK PAIN: 0
ABDOMINAL DISTENTION: 0
CHEST TIGHTNESS: 0
ABDOMINAL PAIN: 0
NAUSEA: 0

## 2025-07-09 ASSESSMENT — PATIENT HEALTH QUESTIONNAIRE - PHQ9
SUM OF ALL RESPONSES TO PHQ QUESTIONS 1-9: 2
SUM OF ALL RESPONSES TO PHQ QUESTIONS 1-9: 2
1. LITTLE INTEREST OR PLEASURE IN DOING THINGS: SEVERAL DAYS
SUM OF ALL RESPONSES TO PHQ QUESTIONS 1-9: 2
2. FEELING DOWN, DEPRESSED OR HOPELESS: SEVERAL DAYS
SUM OF ALL RESPONSES TO PHQ QUESTIONS 1-9: 2

## 2025-07-09 NOTE — PROGRESS NOTES
Reflexes: Reflexes are normal and symmetric.   Psychiatric:         Behavior: Behavior normal.         Assessment:      Diagnosis Orders   1. Essential hypertension  CBC    Comprehensive Metabolic Panel    Lipid Panel    losartan (COZAAR) 100 MG tablet      2. Hyperglycemia  Comprehensive Metabolic Panel    Lipid Panel    Hemoglobin A1C      3. Stage 3a chronic kidney disease (HCC)  Comprehensive Metabolic Panel      4. Vitamin D deficiency  Vitamin D 25 Hydroxy      5. Gastroesophageal reflux disease without esophagitis        6. Osteoarthritis, unspecified osteoarthritis type, unspecified site        7. History of endometrial cancer        8. Other fatigue  T4, Free    TSH    Vitamin B12      9. Foot swelling  furosemide (LASIX) 20 MG tablet            Plan:     Orders Placed This Encounter   Procedures    CBC     Standing Status:   Future     Expected Date:   10/9/2025     Expiration Date:   7/9/2026    Comprehensive Metabolic Panel     Standing Status:   Future     Expected Date:   10/9/2025     Expiration Date:   7/9/2026    Lipid Panel     Standing Status:   Future     Expected Date:   10/9/2025     Expiration Date:   7/9/2026     Is Patient Fasting?/# of Hours:   8-10 Hours    Hemoglobin A1C     Standing Status:   Future     Expected Date:   10/9/2025     Expiration Date:   7/9/2026    Vitamin D 25 Hydroxy     Standing Status:   Future     Expected Date:   10/9/2025     Expiration Date:   7/9/2026    T4, Free     Standing Status:   Future     Expected Date:   10/9/2025     Expiration Date:   7/9/2026    TSH     Standing Status:   Future     Expected Date:   10/9/2025     Expiration Date:   7/9/2026    Vitamin B12     Standing Status:   Future     Expected Date:   10/10/2025     Expiration Date:   7/9/2026      Orders Placed This Encounter   Medications    losartan (COZAAR) 100 MG tablet     Sig: Take 1 tablet by mouth daily     Dispense:  90 tablet     Refill:  3    omeprazole (PRILOSEC OTC) 20 MG tablet